# Patient Record
Sex: MALE | Race: WHITE
[De-identification: names, ages, dates, MRNs, and addresses within clinical notes are randomized per-mention and may not be internally consistent; named-entity substitution may affect disease eponyms.]

---

## 2018-04-03 ENCOUNTER — HOSPITAL ENCOUNTER (EMERGENCY)
Dept: HOSPITAL 33 - ED | Age: 58
Discharge: HOME | End: 2018-04-03
Payer: MEDICARE

## 2018-04-03 VITALS — OXYGEN SATURATION: 98 %

## 2018-04-03 VITALS — DIASTOLIC BLOOD PRESSURE: 75 MMHG | HEART RATE: 70 BPM | SYSTOLIC BLOOD PRESSURE: 168 MMHG

## 2018-04-03 DIAGNOSIS — Z79.899: ICD-10-CM

## 2018-04-03 DIAGNOSIS — K59.01: Primary | ICD-10-CM

## 2018-04-03 DIAGNOSIS — E87.1: ICD-10-CM

## 2018-04-03 LAB
ALBUMIN SERPL-MCNC: 4.9 G/DL (ref 3.5–5)
ALP SERPL-CCNC: 76 U/L (ref 38–126)
ALT SERPL-CCNC: 21 U/L (ref 0–50)
ANION GAP SERPL CALC-SCNC: 17.4 MEQ/L (ref 5–15)
AST SERPL QL: 26 U/L (ref 17–59)
BILIRUB BLD-MCNC: 0.6 MG/DL (ref 0.2–1.3)
BUN SERPL-MCNC: 5 MG/DL (ref 9–20)
CALCIUM SPEC-MCNC: 9.7 MG/DL (ref 8.4–10.2)
CELLS COUNTED: 100
CHLORIDE SERPL-SCNC: 88 MMOL/L (ref 98–107)
CO2 SERPL-SCNC: 26 MMOL/L (ref 22–30)
CREAT SERPL-MCNC: 0.6 MG/DL (ref 0.66–1.25)
GLUCOSE SERPL-MCNC: 118 MG/DL (ref 74–106)
GLUCOSE UR-MCNC: NEGATIVE MG/DL
GRANULOCYTES # BLD AUTO: 8.87 10*3/UL (ref 1.4–6.9)
HCT VFR BLD AUTO: 43.5 % (ref 42–50)
HGB BLD-MCNC: 15.5 GM/DL (ref 12.5–18)
MANUAL DIF COMMENT BLD-IMP: NORMAL
MCH RBC QN AUTO: 30.5 PG (ref 26–32)
MCHC RBC AUTO-ENTMCNC: 35.6 G/DL (ref 32–36)
PLATELET # BLD AUTO: 185 K/MM3 (ref 150–450)
POTASSIUM SERPLBLD-SCNC: 5.2 MMOL/L (ref 3.5–5.1)
PROT SERPL-MCNC: 7.8 G/DL (ref 6.3–8.2)
PROT UR STRIP-MCNC: NEGATIVE MG/DL
RBC # BLD AUTO: 5.08 M/MM3 (ref 4.1–5.6)
SODIUM SERPL-SCNC: 126 MMOL/L (ref 137–145)
WBC # BLD AUTO: 10.1 K/MM3 (ref 4–10.5)

## 2018-04-03 PROCEDURE — 84443 ASSAY THYROID STIM HORMONE: CPT

## 2018-04-03 PROCEDURE — 81002 URINALYSIS NONAUTO W/O SCOPE: CPT

## 2018-04-03 PROCEDURE — 85025 COMPLETE CBC W/AUTO DIFF WBC: CPT

## 2018-04-03 PROCEDURE — 36415 COLL VENOUS BLD VENIPUNCTURE: CPT

## 2018-04-03 PROCEDURE — 74022 RADEX COMPL AQT ABD SERIES: CPT

## 2018-04-03 PROCEDURE — 80053 COMPREHEN METABOLIC PANEL: CPT

## 2018-04-03 PROCEDURE — 99283 EMERGENCY DEPT VISIT LOW MDM: CPT

## 2018-04-03 PROCEDURE — 99284 EMERGENCY DEPT VISIT MOD MDM: CPT

## 2018-04-03 NOTE — ERPHSYRPT
- History of Present Illness


Time Seen by Provider: 04/03/18 11:30


Source: patient


Patient Subjective Stated Complaint: Pt states "I have been having troubles 

with constipation.  I have a hard peice stuck up on there and I have tried to 

flush it out and I keep pushing it up farther."


Triage Nursing Assessment: Pt alert and oriented X 3, skin pwd PT ambulates 

without any difficulty, able to speak in clear full sentences. Pt in no 

apparent respiratory distress.


Physician History: 





CC: constipation


HX: 58 y/o patient of Dr Hernandez. He had prior stroke 3 years ago. Went home 

from the hospital on lactulose for constipation. Denies hx of liver disease. He 

had colonoscopy 3 years ago in [place]. He has continued constipation. Feels 

like rectal plug. Used enema bulb twice and thought it pushed up higher. He has 

tried stool softeners. Came to ER as unable to stool. No fever, chills, abd pain

, vomiting, back pain, or diff with urination.


Allergies/Adverse Reactions: 








Sulfa (Sulfonamide Antibiotics) Allergy (Intermediate, Verified 04/03/18 11:38)


 burn when urinating





Home Medications: 








Clopidogrel Bisulfate [Clopidogrel] 75 mg PO DAILY 04/03/18 [History]


Flu Vac Qs 17-18(4Yr Up)Merced/Pf [Flucelvax Quad 7590-4894 Syr] 1 tab PO DAILY 04/ 03/18 [History]


Lactulose [Lactulose] 2 mg PO DAILY 04/03/18 [History]


Lisinopril [Lisinopril] 20 mg PO DAILY 04/03/18 [History]





Hx Tetanus, Diphtheria Vaccination/Date Given: Yes


Hx Influenza Vaccination/Date Given: Yes


Hx Pneumococcal Vaccination/Date Given: No


Immunizations Up to Date: Yes





- Review of Systems


Constitutional: No Fever, No Chills


Eyes: No Symptoms


Ears, Nose, & Throat: No Symptoms


Respiratory: No Cough


Cardiac: No Chest Pain


Abdominal/Gastrointestinal: Constipation, No Abdominal Pain, No Nausea, No 

Vomiting, No Diarrhea


Genitourinary Symptoms: No Dysuria


Musculoskeletal: No Back Pain


Skin: No Rash


Neurological: No Headache


All Other Systems: Reviewed and Negative





- Past Medical History


Pertinent Past Medical History: Yes


Neurological History: Stroke


ENT History: No Pertinent History


Cardiac History: No Pertinent History


Respiratory History: No Pertinent History


Endocrine Medical History: No Pertinent History


Musculoskeletal History: Arthritis


GI Medical History: Other


 History: No Pertinent History


Psycho-Social History: Anxiety


Male Reproductive Disorders: No Pertinent History


Other Medical History: constipation





- Past Surgical History


Past Surgical History: No





- Social History


Smoking Status: Former smoker


Exposure to second hand smoke: No


Drug Use: none


Patient Lives Alone: Yes





- Nursing Vital Signs


Nursing Vital Signs: 


 Initial Vital Signs











Temperature  98.2 F   04/03/18 11:24


 


Pulse Rate  108 H  04/03/18 11:24


 


Respiratory Rate  16   04/03/18 11:24


 


Blood Pressure  162/92   04/03/18 11:24


 


O2 Sat by Pulse Oximetry  98   04/03/18 11:24








 Pain Scale











Pain Intensity                 0

















- Physical Exam


General Appearance: alert


Eye Exam: PERRL/EOMI


Ears, Nose, Throat Exam: normal ENT inspection, moist mucous membranes


Neck Exam: normal inspection, non-tender, supple


Respiratory Exam: normal breath sounds


Cardiovascular Exam: regular rate/rhythm


Gastrointestinal/Abdomen Exam: soft, No tenderness, No distention, No guarding


Male Genitalia Exam: normal genitalia


Rectal Exam: normal rectal tone, other (minimal soft brown stool high no fecal 

impaction or plug. With RN Timo), No mass, No blood


Back Exam: normal inspection, No vertebral tenderness


Extremity Exam: normal inspection, normal range of motion


Neurologic Exam: alert, oriented x 3, cooperative, CNs II-XII nml as tested, 

sensation nml, No motor deficits


Skin Exam: warm, dry, No rash


**SpO2 Interpretation**: normal


SpO2: 98


Oxygen Delivery: Room Air





- Course


Nursing assessment & vital signs reviewed: Yes





- Radiology Exams


  ** AAS


X-ray Interpretation: Teleradiologist Report (negative, fecal stasis)


Ordered Tests: 


 Active Orders 24 hr











 Category Date Time Status


 


 Clean Catch Urine Specimen STAT Care  04/03/18 11:47 Active


 


 OBSTR/ACUTE ABDOMEN SERIES Stat Exams  04/03/18 11:47 Taken


 


 CBC W DIFF Stat Lab  04/03/18 11:47 Completed


 


 CMP Stat Lab  04/03/18 11:47 Completed


 


 Manual Differential NC Stat Lab  04/03/18 11:47 Completed


 


 TSH [TSH, 3RD Generation] Stat Lab  04/03/18 11:48 Ordered


 


 UA W/RFX UR CULTURE Stat Lab  04/03/18 12:12 Completed








Medication Summary














Discontinued Medications














Generic Name Dose Route Start Last Admin





  Trade Name Freq  PRN Reason Stop Dose Admin


 


Lidocaine HCl  200 mg  04/03/18 12:11  04/03/18 12:21





  Xylocaine 2% Uro-Jet***  TOP  04/03/18 12:12  200 mg





  STAT ONE   Administration


 


Lidocaine HCl  Confirm  04/03/18 12:12  





  Xylocaine 2% Uro-Jet***  Administered  04/03/18 12:13  





  Dose   





  200 mg   





  .ROUTE   





  .STK-MED ONE   











Lab/Rad Data: 


 Laboratory Result Diagrams





 04/03/18 11:47 





 04/03/18 11:47 





 Laboratory Results











  04/03/18 04/03/18 04/03/18 Range/Units





  12:12 11:47 11:47 


 


WBC    10.1  (4.0-10.5)  K/mm3


 


RBC    5.08  (4.1-5.6)  M/mm3


 


Hgb    15.5  (12.5-18.0)  gm/dl


 


Hct    43.5  (42-50)  %


 


MCV    85.6  ()  fl


 


MCH    30.5  (26-32)  pg


 


MCHC    35.6  (32-36)  g/dl


 


RDW    11.8  (11.5-14.0)  %


 


Plt Count    185  (150-450)  K/mm3


 


MPV    10.5 H  (6-9.5)  fl


 


Absolute Granulocytes    8.87 H  (1.4-6.9)  


 


Sodium   126 L   (137-145)  mmol/L


 


Potassium   5.2 H   (3.5-5.1)  mmol/L


 


Chloride   88 L   ()  mmol/L


 


Carbon Dioxide   26   (22-30)  mmol/L


 


Anion Gap   17.4 H   (5-15)  MEQ/L


 


BUN   5 L   (9-20)  mg/dL


 


Creatinine   0.60 L   (0.66-1.25)  mg/dL


 


Estimated GFR   > 60.0   ML/MIN


 


Glucose   118 H   ()  mg/dL


 


Calcium   9.7   (8.4-10.2)  mg/dL


 


Total Bilirubin   0.60   (0.2-1.3)  mg/dL


 


AST   26   (17-59)  U/L


 


ALT   21   (0-50)  U/L


 


Alkaline Phosphatase   76   ()  U/L


 


Serum Total Protein   7.8   (6.3-8.2)  g/dL


 


Albumin   4.9   (3.5-5.0)  g/dL


 


Ur Collection Type  CLEAN CATCH    


 


Urine Color  YELLOW    (YELLOW)  


 


Urine Appearance  CLEAR    (CLEAR)  


 


Urine pH  7.0    (5-6)  


 


Ur Specific Gravity  1.010    (1.005-1.025)  


 


Urine Protein  NEGATIVE    (Negative)  


 


Urine Ketones  TRACE    (NEGATIVE)  


 


Urine Blood  NEGATIVE    (0-5)  Kg/ul


 


Urine Nitrite  NEGATIVE    (NEGATIVE)  


 


Urine Bilirubin  NEGATIVE    (NEGATIVE)  


 


Urine Urobilinogen  NORMAL    (0-1)  mg/dL


 


Ur Leukocyte Esterase  NEGATIVE    (NEGATIVE)  


 


Urine Culture Reflexed  NO    (NO)  


 


Urine Glucose  NEGATIVE    (NEGATIVE)  mg/dL


 


Specimen Received  04-03-18 1200    














- Progress


Progress Note: 





04/03/18 12:19


Advised mag citrate and miralax and to follow up with Dr Hernandez for bowel 

regimen. 


04/03/18 13:08


Low Na. Usually low 130. Called Dr Hernandez who will recheck lytes and see in 

office tomorrow 8:45.





Counseled pt/family regarding: drug and/or alcohol abuse, lab results, diagnosis

, need for follow-up





- Departure


Time of Disposition: 13:08


Departure Disposition: Home


Clinical Impression: 


 Hyponatremia





Constipation


Qualifiers:


 Constipation type: slow transit constipation Qualified Code(s): K59.01 - Slow 

transit constipation





Condition: Stable


Critical Care Time: No


Referrals: 


OMAR HERNANDEZ [Primary Care Provider] - 


Instructions:  Constipation, Adult (DC), Hyponatremia (DC)


Additional Instructions: 


Take one bottle mag citrate now.


Take miralax 17gm with cup of prune juice nightly until going good.


Follow up tomorrow at 8:45AM with Dr Hernandez.


Get blood drawn at hospital lab before Dr appointment in AM.











Prescriptions: 


Polyethylene Glycol 3350 [Miralax Powder] 17 g PO QHS #1 bottle


Magnesium Citrate 296 ml*** [Citroma 296 ml***] 300 ml PO STAT #1 solution

## 2020-08-24 ENCOUNTER — HOSPITAL ENCOUNTER (INPATIENT)
Dept: HOSPITAL 33 - ED | Age: 60
LOS: 2 days | Discharge: HOME | DRG: 641 | End: 2020-08-26
Attending: FAMILY MEDICINE | Admitting: FAMILY MEDICINE
Payer: MEDICARE

## 2020-08-24 DIAGNOSIS — R11.2: ICD-10-CM

## 2020-08-24 DIAGNOSIS — E78.5: ICD-10-CM

## 2020-08-24 DIAGNOSIS — Z79.01: ICD-10-CM

## 2020-08-24 DIAGNOSIS — I69.851: ICD-10-CM

## 2020-08-24 DIAGNOSIS — I10: ICD-10-CM

## 2020-08-24 DIAGNOSIS — R55: ICD-10-CM

## 2020-08-24 DIAGNOSIS — Z86.79: ICD-10-CM

## 2020-08-24 DIAGNOSIS — Z79.899: ICD-10-CM

## 2020-08-24 DIAGNOSIS — E87.1: Primary | ICD-10-CM

## 2020-08-24 LAB
ALBUMIN SERPL-MCNC: 5.2 G/DL (ref 3.5–5)
ALP SERPL-CCNC: 57 U/L (ref 38–126)
ALT SERPL-CCNC: 27 U/L (ref 0–50)
AMPHETAMINES UR QL: NEGATIVE
ANION GAP SERPL CALC-SCNC: 17.1 MEQ/L (ref 5–15)
AST SERPL QL: 37 U/L (ref 17–59)
BARBITURATES UR QL: NEGATIVE
BASOPHILS # BLD AUTO: 0.02 10*3/UL (ref 0–0.4)
BASOPHILS NFR BLD AUTO: 0.2 % (ref 0–0.4)
BENZODIAZ UR QL SCN: NEGATIVE
BILIRUB BLD-MCNC: 1.3 MG/DL (ref 0.2–1.3)
BLD SMEAR INTERP: YES
BUN SERPL-MCNC: 5 MG/DL (ref 9–20)
CALCIUM SPEC-MCNC: 9.6 MG/DL (ref 8.4–10.2)
CHLORIDE SERPL-SCNC: 82 MMOL/L (ref 98–107)
CO2 SERPL-SCNC: 27 MMOL/L (ref 22–30)
COCAINE UR QL SCN: NEGATIVE
CREAT SERPL-MCNC: 0.72 MG/DL (ref 0.66–1.25)
D DIMER BLD IA.RAPID-MCNC: 444 NG/ML (ref 215–500)
EOSINOPHIL # BLD AUTO: 0.01 10*3/UL (ref 0–0.5)
ETHANOL SERPL-MCNC: < 10 MG/DL (ref 0–10)
GFR SERPLBLD BASED ON 1.73 SQ M-ARVRAT: > 60 ML/MIN
GLUCOSE SERPL-MCNC: 113 MG/DL (ref 74–106)
GLUCOSE UR-MCNC: NEGATIVE MG/DL
HCT VFR BLD AUTO: 42.6 % (ref 42–50)
HGB BLD-MCNC: 15 GM/DL (ref 12.5–18)
INR PPP: 1.18 (ref 0.8–3)
LYMPHOCYTES # SPEC AUTO: 0.59 10*3/UL (ref 1–4.6)
MCH RBC QN AUTO: 30.4 PG (ref 26–32)
MCHC RBC AUTO-ENTMCNC: 35.2 G/DL (ref 32–36)
METHADONE UR QL: NEGATIVE
MONOCYTES # BLD AUTO: 0.6 10*3/UL (ref 0–1.3)
OPIATES UR QL: NEGATIVE
PCP UR QL CFM>20 NG/ML: NEGATIVE
PLATELET # BLD AUTO: 171 K/MM3 (ref 150–450)
POTASSIUM SERPLBLD-SCNC: 4.2 MMOL/L (ref 3.5–5.1)
PROT SERPL-MCNC: 8 G/DL (ref 6.3–8.2)
PROT UR STRIP-MCNC: NEGATIVE MG/DL
PROTHROMBIN TIME: 13.3 SECONDS (ref 8.83–12.87)
RBC # BLD AUTO: 4.93 M/MM3 (ref 4.1–5.6)
RBC #/AREA URNS HPF: (no result) /HPF (ref 0–2)
SODIUM SERPL-SCNC: 121 MMOL/L (ref 137–145)
THC UR QL SCN: NEGATIVE
WBC # BLD AUTO: 11.7 K/MM3 (ref 4–10.5)
WBC #/AREA URNS HPF: (no result) /HPF (ref 0–5)

## 2020-08-24 PROCEDURE — 93041 RHYTHM ECG TRACING: CPT

## 2020-08-24 PROCEDURE — 70450 CT HEAD/BRAIN W/O DYE: CPT

## 2020-08-24 PROCEDURE — 96360 HYDRATION IV INFUSION INIT: CPT

## 2020-08-24 PROCEDURE — 87040 BLOOD CULTURE FOR BACTERIA: CPT

## 2020-08-24 PROCEDURE — 83605 ASSAY OF LACTIC ACID: CPT

## 2020-08-24 PROCEDURE — 93005 ELECTROCARDIOGRAM TRACING: CPT

## 2020-08-24 PROCEDURE — 85025 COMPLETE CBC W/AUTO DIFF WBC: CPT

## 2020-08-24 PROCEDURE — 80307 DRUG TEST PRSMV CHEM ANLYZR: CPT

## 2020-08-24 PROCEDURE — 95812 EEG 41-60 MINUTES: CPT

## 2020-08-24 PROCEDURE — 85379 FIBRIN DEGRADATION QUANT: CPT

## 2020-08-24 PROCEDURE — 36415 COLL VENOUS BLD VENIPUNCTURE: CPT

## 2020-08-24 PROCEDURE — 84484 ASSAY OF TROPONIN QUANT: CPT

## 2020-08-24 PROCEDURE — 99284 EMERGENCY DEPT VISIT MOD MDM: CPT

## 2020-08-24 PROCEDURE — 71045 X-RAY EXAM CHEST 1 VIEW: CPT

## 2020-08-24 PROCEDURE — 81001 URINALYSIS AUTO W/SCOPE: CPT

## 2020-08-24 PROCEDURE — 80053 COMPREHEN METABOLIC PANEL: CPT

## 2020-08-24 PROCEDURE — G0480 DRUG TEST DEF 1-7 CLASSES: HCPCS

## 2020-08-24 PROCEDURE — 85610 PROTHROMBIN TIME: CPT

## 2020-08-24 NOTE — ERPHSYRPT
- History of Present Illness


Source: patient


Exam Limitations: clinical condition


Patient Subjective Stated Complaint: PT HERE FOR FOR A SYNCOPE EPISODE TODAY 

AFTER GOING TO CARDIOLOGY OFFICE TODAY AFTER FAILING A STRESS TEST A MONTH AGO, 

HE ASLO STATES HE WAS IN ER LAST NIGHT FOR PAIN TO LEFT ARM AND SENT HOME, HE 

STATES HE HAS NOT FELT WELL FOR A FEW DAYS, IT STATES HE FEELS CONSTIPATED


Triage Nursing Assessment: PT ALERT , RESP EASY,. FACE MASK IN PLACE, PALE, 

DENIES PAIN NOW,NO EDEMA NOTED


Physician History: 





Is a 60-year-old male who presents after having a syncopal episode versus 

seizure at home today.  He recently had a stress test with Dr. May and 

according to him he "failed the test.  Today he saw Dr. Sanford who told him t

hat he was doing okay and he wanted to get a carotid Doppler on him scheduled 

for September 2.  After he left he went to his home where he was witnessed to 

have a syncopal episode versus seizure he is had some nausea and vomiting 

associated with this episode and presently is alert and oriented but not feeling

well..  It turns out he was actually seen at [facility] in 

[place] yesterday at that time he was evaluated and found to have a sodium of 

125 which was not mentioned in the commentary.  He did recently have a change in

his blood pressure medicines from lisinopril to lisinopril hydrochlorothiazide 

combination.


Timing/Duration: today (Syncope versus seizure)


Severity: moderate


Character of Deficits: none


Deficits: no difficulties


Baseline/Normal Cognition: alert oriented x 3


Current Cognition: alert oriented x 3


Baseline Gait: walks w/o assistance


Associated Symptoms: nausea, vomiting, headache


Allergies/Adverse Reactions: 








Sulfa (Sulfonamide Antibiotics) Allergy (Intermediate, Verified 08/24/20 15:03)


   burn when urinating





Home Medications: 








Clopidogrel Bisulfate [Clopidogrel] 75 mg PO DAILY 04/03/18 [History]


Flu Vac Qs 17-18(4Yr Up)Merced/Pf [Flucelvax Quad 8202-1645 Syr] 1 tab PO DAILY 

04/03/18 [History]


Lactulose 2 mg PO DAILY 04/03/18 [History]


lisinopriL [Lisinopril] 20 mg PO DAILY 04/03/18 [History]





Hx Tetanus, Diphtheria Vaccination/Date Given: Yes


Hx Influenza Vaccination/Date Given: Yes


Hx Pneumococcal Vaccination/Date Given: No


Immunizations Up to Date: Yes





Travel Risk





- International Travel


Have you traveled outside of the country in past 3 weeks: No





- Coronavirus Screening


Are you exhibiting any of the following symptoms?: No


Close contact with a COVID-19 positive Pt in past 14-21 Days: No





- Review of Systems


Constitutional: No Fever, No Chills


Eyes: No Symptoms


Ears, Nose, & Throat: No Symptoms


Respiratory: No Cough, No Dyspnea


Cardiac: No Chest Pain, No Edema, No Syncope


Abdominal/Gastrointestinal: No Abdominal Pain, No Nausea, No Vomiting, No 

Diarrhea


Genitourinary Symptoms: No Dysuria


Musculoskeletal: No Back Pain, No Neck Pain


Skin: No Rash


Neurological: Seizure (Syncope), No Dizziness, No Focal Weakness, No Sensory 

Changes


Psychological: No Symptoms


Endocrine: No Symptoms


All Other Systems: Reviewed and Negative





- Past Medical History


Pertinent Past Medical History: Yes


Neurological History: Stroke


ENT History: No Pertinent History


Cardiac History: No Pertinent History


Respiratory History: No Pertinent History


Endocrine Medical History: No Pertinent History


Musculoskeletal History: Arthritis


GI Medical History: Other


 History: No Pertinent History


Psycho-Social History: Anxiety


Male Reproductive Disorders: No Pertinent History


Other Medical History: constipation, FAILED STRESS TEST 7/2020





- Past Surgical History


Past Surgical History: No





- Social History


Smoking Status: Former smoker


Exposure to second hand smoke: No


Drug Use: none


Patient Lives Alone: Yes





- Nursing Vital Signs


Nursing Vital Signs: 





                               Initial Vital Signs











Pulse Rate  67   08/24/20 14:50


 


Respiratory Rate  18   08/24/20 14:50


 


Blood Pressure  130/77   08/24/20 14:50


 


O2 Sat by Pulse Oximetry  99   08/24/20 14:50








                                   Pain Scale











Pain Intensity                 0

















- [place] Coma Scale


Best Eye Response ([place]): (4) open spontaneously


Best Verbal Response ([place]): (5) oriented


Best Motor Response ([place]): (6) obeys commands


[place] Total: 15





- Physical Exam


General Appearance: no apparent distress, alert


Eye Exam: bilateral eye: PERRL, EOMI


Ears, Nose, Throat Exam: normal ENT inspection, moist mucous membranes


Neck Exam: normal inspection, non-tender, supple


Respiratory: normal breath sounds, lungs clear, airway intact, No respiratory 

distress


Cardiovascular: regular rate/rhythm, No edema


Gastrointestinal: soft, No tenderness, No distention


Back Exam: normal inspection


Extremity Exam: normal inspection, No pedal edema


Mental Status: alert, oriented x 3


CNs Exam: tongue midline


Coordination/Gait: normal finger to nose, normal gait


Skin Exam: normal color, warm, dry, No rash


SpO2: 100





- Course


Nursing assessment & vital signs reviewed: Yes


EKG Interpreted by Me: RATE (61), NORMAL AXIS, NORMAL INTERVALS, NORMAL QRS, 

NORMAL ST-T





- Radiology Exams


  ** X-ray no acute findings


X-ray Interpretation: Other (No acute findings)





- CT Exams


  ** Head


CT Interpretation: No/Intracranial Hemorrhag ( CT of the head), Other (No acute 

findings on)


Ordered Tests: 





                               Active Orders 24 hr











 Category Date Time Status


 


 Cardiac Monitor STAT Care  08/24/20 15:13 Active


 


 EKG-ER Only STAT Care  08/24/20 15:10 Active


 


 CHEST 1 VIEW (PORTABLE) Stat Exams  08/24/20 15:10 Completed


 


 HEAD WITHOUT CONTRAST [CT] Stat Exams  08/24/20 15:13 Completed


 


 BLOOD CULTURE Stat Lab  08/24/20 15:34 Ordered


 


 CBC W DIFF Stat Lab  08/24/20 15:34 Completed


 


 CMP Stat Lab  08/24/20 15:34 Completed


 


 D-DIMER QUANTITATIVE Stat Lab  08/24/20 15:34 Received


 


 ETHYL ALCOHOL Stat Lab  08/24/20 15:34 Completed


 


 PROTIME WITH INR Stat Lab  08/24/20 15:34 Received


 


 TROPONIN Q3H Lab  08/24/20 15:15 Completed


 


 TROPONIN Q3H Lab  08/24/20 18:15 Ordered


 


 TROPONIN Q3H Lab  08/24/20 21:15 Ordered


 


 UA W/RFX UR CULTURE Stat Lab  08/24/20 16:27 Ordered


 


 Urine Triage Profile Stat Lab  08/24/20 16:27 Completed








Medication Summary














Discontinued Medications














Generic Name Dose Route Start Last Admin





  Trade Name Christina  PRN Reason Stop Dose Admin


 


Sodium Chloride  1,000 mls @ 999 mls/hr  08/24/20 15:10  08/24/20 17:15





  Sodium Chloride 0.9% 1000 Ml  IV  08/24/20 16:10  Infused





  .Q1H1M STA   Infusion


 


Sodium Chloride  Confirm  08/24/20 15:19 





  Sodium Chloride 0.9% 1000 Ml  Administered  08/24/20 15:20 





  Dose  





  1,000 mls @ ud  





  .ROUTE  





  .STK-MED ONE  











Lab/Rad Data: 





                           Laboratory Result Diagrams





                                 08/24/20 15:34 





                                 08/24/20 15:34 





                               Laboratory Results











  08/24/20 08/24/20 08/24/20 Range/Units





  16:27 15:34 15:34 


 


WBC    11.7 H  (4.0-10.5)  K/mm3


 


RBC    4.93  (4.1-5.6)  M/mm3


 


Hgb    15.0  (12.5-18.0)  gm/dl


 


Hct    42.6  (42-50)  %


 


MCV    86.4  ()  fl


 


MCH    30.4  (26-32)  pg


 


MCHC    35.2  (32-36)  g/dl


 


RDW    11.9  (11.5-14.0)  %


 


Plt Count    171  (150-450)  K/mm3


 


MPV    11.6 H  (7.5-11.0)  fl


 


Gran %    89.6 H  (36.0-66.0)  %


 


Eos # (Auto)    0.01  (0-0.5)  


 


Absolute Lymphs (auto)    0.59 L  (1.0-4.6)  


 


Absolute Monos (auto)    0.60  (0.0-1.3)  


 


Lymphocytes %    5.0 L  (24.0-44.0)  %


 


Monocytes %    5.1  (0.0-12.0)  %


 


Eosinophils %    0.1  (0.00-5.0)  %


 


Basophils %    0.2  (0.0-0.4)  %


 


Absolute Granulocytes    10.48 H  (1.4-6.9)  


 


Basophils #    0.02  (0-0.4)  


 


Sodium   121 L   (137-145)  mmol/L


 


Potassium   4.2   (3.5-5.1)  mmol/L


 


Chloride   82 L   ()  mmol/L


 


Carbon Dioxide   27   (22-30)  mmol/L


 


Anion Gap   17.1 H   (5-15)  MEQ/L


 


BUN   5 L   (9-20)  mg/dL


 


Creatinine   0.72   (0.66-1.25)  mg/dL


 


Estimated GFR   > 60.0   ML/MIN


 


Glucose   113 H   ()  mg/dL


 


Calcium   9.6   (8.4-10.2)  mg/dL


 


Total Bilirubin   1.30   (0.2-1.3)  mg/dL


 


AST   37   (17-59)  U/L


 


ALT   27   (0-50)  U/L


 


Alkaline Phosphatase   57   ()  U/L


 


Troponin I     (0.000-0.034)  ng/mL


 


Serum Total Protein   8.0   (6.3-8.2)  g/dL


 


Albumin   5.2 H   (3.5-5.0)  g/dL


 


Urine Opiates Level  NEGATIVE    (NEGATIVE)  


 


Ur Methadone  NEGATIVE    (NEGATIVE)  


 


Urine Barbiturates  NEGATIVE    (NEGATIVE)  


 


Ur Phencyclidine (PCP)  NEGATIVE    (NEGATIVE)  


 


Urine Amphetamine  NEGATIVE    (NEGATIVE)  


 


U Benzodiazepine Level  NEGATIVE    (NEGATIVE)  


 


Urine Cocaine  NEGATIVE    (NEGATIVE)  


 


Urine Marijuana (THC)  NEGATIVE    (NEGATIVE)  


 


Ethyl Alcohol   < 10   (0-10)  mg/dL














  08/24/20 Range/Units





  15:15 


 


WBC   (4.0-10.5)  K/mm3


 


RBC   (4.1-5.6)  M/mm3


 


Hgb   (12.5-18.0)  gm/dl


 


Hct   (42-50)  %


 


MCV   ()  fl


 


MCH   (26-32)  pg


 


MCHC   (32-36)  g/dl


 


RDW   (11.5-14.0)  %


 


Plt Count   (150-450)  K/mm3


 


MPV   (7.5-11.0)  fl


 


Gran %   (36.0-66.0)  %


 


Eos # (Auto)   (0-0.5)  


 


Absolute Lymphs (auto)   (1.0-4.6)  


 


Absolute Monos (auto)   (0.0-1.3)  


 


Lymphocytes %   (24.0-44.0)  %


 


Monocytes %   (0.0-12.0)  %


 


Eosinophils %   (0.00-5.0)  %


 


Basophils %   (0.0-0.4)  %


 


Absolute Granulocytes   (1.4-6.9)  


 


Basophils #   (0-0.4)  


 


Sodium   (137-145)  mmol/L


 


Potassium   (3.5-5.1)  mmol/L


 


Chloride   ()  mmol/L


 


Carbon Dioxide   (22-30)  mmol/L


 


Anion Gap   (5-15)  MEQ/L


 


BUN   (9-20)  mg/dL


 


Creatinine   (0.66-1.25)  mg/dL


 


Estimated GFR   ML/MIN


 


Glucose   ()  mg/dL


 


Calcium   (8.4-10.2)  mg/dL


 


Total Bilirubin   (0.2-1.3)  mg/dL


 


AST   (17-59)  U/L


 


ALT   (0-50)  U/L


 


Alkaline Phosphatase   ()  U/L


 


Troponin I  < 0.012  (0.000-0.034)  ng/mL


 


Serum Total Protein   (6.3-8.2)  g/dL


 


Albumin   (3.5-5.0)  g/dL


 


Urine Opiates Level   (NEGATIVE)  


 


Ur Methadone   (NEGATIVE)  


 


Urine Barbiturates   (NEGATIVE)  


 


Ur Phencyclidine (PCP)   (NEGATIVE)  


 


Urine Amphetamine   (NEGATIVE)  


 


U Benzodiazepine Level   (NEGATIVE)  


 


Urine Cocaine   (NEGATIVE)  


 


Urine Marijuana (THC)   (NEGATIVE)  


 


Ethyl Alcohol   (0-10)  mg/dL














- Progress


Progress: improved


Discussed with Dr.: Duarte





- Departure


Departure Disposition: In-patient Admission


Clinical Impression: 


 Hyponatremia





Condition: Fair


Critical Care Time: No


Referrals: 


OMAR PORTILLO [Primary Care Provider] - 


Instructions:  Hyponatremia (DC)

## 2020-08-24 NOTE — XRAY
Indication: Syncope.  Vomiting.



Comparison: April 3, 2018.



Portable chest again demonstrates normal heart and lungs with a few incidental

left lung calcified granulomas.  Bony thorax intact.  No new/acute findings.

## 2020-08-24 NOTE — XRAY
Indication: Syncopal episode.  Vomiting and sweating.



Multiple contiguous axial images obtained through the head without contrast.



Comparison: None



Age appropriate global atrophy.  Small 1 cm focus old infarct adjacent to left

frontal horn.  No acute intra-cranial hemorrhage, abnormal extra-axial fluid

collection, or mass effect.  Fourth ventricle is midline without

hydrocephalus.  Gray-white matter differentiation preserved.  Bony calvarium

intact.  Minimal mucosal thickening floor of both maxillary sinuses.

Remaining visualized paranasal sinuses and mastoid air cells are clear.



Impression: Small focus old infarct adjacent to left frontal horn.  No acute

intracranial abnormalities.  Minimal paranasal sinus disease.

## 2020-08-25 LAB
ALBUMIN SERPL-MCNC: 4.6 G/DL (ref 3.5–5)
ALP SERPL-CCNC: 54 U/L (ref 38–126)
ALT SERPL-CCNC: 25 U/L (ref 0–50)
ANION GAP SERPL CALC-SCNC: 13.6 MEQ/L (ref 5–15)
AST SERPL QL: 33 U/L (ref 17–59)
BASOPHILS # BLD AUTO: 0.01 10*3/UL (ref 0–0.4)
BASOPHILS NFR BLD AUTO: 0.1 % (ref 0–0.4)
BILIRUB BLD-MCNC: 1.4 MG/DL (ref 0.2–1.3)
BUN SERPL-MCNC: 5 MG/DL (ref 9–20)
CALCIUM SPEC-MCNC: 9.2 MG/DL (ref 8.4–10.2)
CHLORIDE SERPL-SCNC: 88 MMOL/L (ref 98–107)
CO2 SERPL-SCNC: 24 MMOL/L (ref 22–30)
CREAT SERPL-MCNC: 0.63 MG/DL (ref 0.66–1.25)
EOSINOPHIL # BLD AUTO: 0.04 10*3/UL (ref 0–0.5)
GFR SERPLBLD BASED ON 1.73 SQ M-ARVRAT: > 60 ML/MIN
GLUCOSE SERPL-MCNC: 100 MG/DL (ref 74–106)
HCT VFR BLD AUTO: 41.1 % (ref 42–50)
HGB BLD-MCNC: 14.2 GM/DL (ref 12.5–18)
LYMPHOCYTES # SPEC AUTO: 1.14 10*3/UL (ref 1–4.6)
MCH RBC QN AUTO: 30 PG (ref 26–32)
MCHC RBC AUTO-ENTMCNC: 34.5 G/DL (ref 32–36)
MONOCYTES # BLD AUTO: 0.72 10*3/UL (ref 0–1.3)
PLATELET # BLD AUTO: 145 K/MM3 (ref 150–450)
POTASSIUM SERPLBLD-SCNC: 4.1 MMOL/L (ref 3.5–5.1)
PROT SERPL-MCNC: 7.2 G/DL (ref 6.3–8.2)
RBC # BLD AUTO: 4.74 M/MM3 (ref 4.1–5.6)
SODIUM SERPL-SCNC: 121 MMOL/L (ref 137–145)
WBC # BLD AUTO: 8.7 K/MM3 (ref 4–10.5)

## 2020-08-25 NOTE — HP
CHIEF COMPLAINT:  Syncopal versus seizure episode. 



HISTORY OF PRESENT ILLNESS:  The patient is a 60 year old white male patient who has been 
under a lot of stress recently. He was having some chest discomfort and was seen at [facility] and ruled out for myocardial infarction and allowed to discharge 
home. He had recently seen Dr. Pagan for cardiology follow up and has additional studies 
scheduled. The patient reports that he had been somewhat nauseated, even the smell of food 
was making him sick at his stomach so he had not eaten anything. He apparently had an 
episode on the way home in the car where he had what appeared to be a syncopal episode 
versus seizure activity. The patient reports that he felt it coming on where everything 
started getting gray and seeing a light and then he woke up and had emesis episode. He was 
brought to the emergency room. Tele-neurology consult was obtained and recommended the 
patient stay in the hospital. During the evaluation he was found to have a sodium of 121. 
The patient had no other significant findings during his evaluation. 



PAST MEDICAL/SURGICAL HISTORY: The patient does have a previous history of CVA involving 
right hemiparesis which is nearly completely resolved. 



MEDICATIONS: He takes medications of Plavix 75 mg daily, lactulose daily for constipation, 
Lisinopril/hydrochlorothiazide which was recently changed to include hydrochlorothiazide 
component. 



ALLERGIES:  SULFA.  

 

PHYSICAL EXAMINATION:  Vital signs in the emergency room showed a pulse of 67, respiratory 
rate 18, blood pressure 130/77.  O2 saturation 99%. 

HEENT:  Normocephalic, atraumatic. Pupils equal round reactive to light. Extraocular 
movements intact. Oropharynx is pink and moist.

NECK:  Supple without lymphadenopathy, thyromegaly or JVD. 

CHEST: Clear to auscultation with good air movement bilaterally. 

HEART: Regular rate and rhythm. 

ABDOMEN: Soft. No palpable masses.

EXTREMITIES: Without cyanosis, clubbing or edema. 

NEUROLOGIC: The patient is alert and oriented x3. No obvious focal deficits are seen at 
this time.

 

LAB DATA AND TESTS:  The patient had chest x-ray which was essentially nonacute. He had 
sinus rhythm on his EKG which appeared to be slight bradycardia. His EKG 12 lead was 
essentially normal with no ST-T wave changes of significance. The patient's lab studies 
revealed his troponins to be less than 0.012. CT scan of the head showed a small focus old 
infarct adjacent to the left frontal horn otherwise no intracranial abnormalities. He had 
a white count of 11,700, hemoglobin 15.0, PLT count 171,000. He had sugar 113, BUN 5, 
creatinine 0.72, sodium 121, potassium 4.2 and chloride 82. Liver enzymes were normal. 
ETOH was less than 10. D-dimer was normal at 444. UA showed specific gravity of 1.011 and 
was otherwise normal.  



ASSESSMENT:  A patient with hyponatremia and what appeared to be a syncopal episode. The 
patient has been admitted to the hospital for observation. He has already had the 
tele-neurology consult who recommended getting his sodium up. We will check an EEG to rule 
out underlying seizure disorder. The patient was placed on fluid restriction with 1,000 cc 
oral. He will receive 50 cc/hour of normal saline IV and salt tablets from the pharmacy. 
We will check his labs in the morning. Once his sodium is improving, he will be discharged 
home should he have no further symptoms particularly in regards to syncope.

## 2020-08-26 VITALS — DIASTOLIC BLOOD PRESSURE: 72 MMHG | SYSTOLIC BLOOD PRESSURE: 133 MMHG | HEART RATE: 64 BPM

## 2020-08-26 VITALS — OXYGEN SATURATION: 98 %

## 2020-08-26 LAB
ALBUMIN SERPL-MCNC: 4.2 G/DL (ref 3.5–5)
ALP SERPL-CCNC: 49 U/L (ref 38–126)
ALT SERPL-CCNC: 28 U/L (ref 0–50)
ANION GAP SERPL CALC-SCNC: 12.1 MEQ/L (ref 5–15)
AST SERPL QL: 34 U/L (ref 17–59)
BASOPHILS # BLD AUTO: 0.02 10*3/UL (ref 0–0.4)
BASOPHILS NFR BLD AUTO: 0.3 % (ref 0–0.4)
BILIRUB BLD-MCNC: 1 MG/DL (ref 0.2–1.3)
BUN SERPL-MCNC: 8 MG/DL (ref 9–20)
CALCIUM SPEC-MCNC: 8.6 MG/DL (ref 8.4–10.2)
CHLORIDE SERPL-SCNC: 96 MMOL/L (ref 98–107)
CO2 SERPL-SCNC: 24 MMOL/L (ref 22–30)
CREAT SERPL-MCNC: 0.65 MG/DL (ref 0.66–1.25)
EOSINOPHIL # BLD AUTO: 0.03 10*3/UL (ref 0–0.5)
GFR SERPLBLD BASED ON 1.73 SQ M-ARVRAT: > 60 ML/MIN
GLUCOSE SERPL-MCNC: 91 MG/DL (ref 74–106)
HCT VFR BLD AUTO: 39.8 % (ref 42–50)
HGB BLD-MCNC: 13.9 GM/DL (ref 12.5–18)
LYMPHOCYTES # SPEC AUTO: 1.03 10*3/UL (ref 1–4.6)
MCH RBC QN AUTO: 30.8 PG (ref 26–32)
MCHC RBC AUTO-ENTMCNC: 34.9 G/DL (ref 32–36)
MONOCYTES # BLD AUTO: 0.59 10*3/UL (ref 0–1.3)
PLATELET # BLD AUTO: 133 K/MM3 (ref 150–450)
POTASSIUM SERPLBLD-SCNC: 4.1 MMOL/L (ref 3.5–5.1)
PROT SERPL-MCNC: 6.7 G/DL (ref 6.3–8.2)
RBC # BLD AUTO: 4.52 M/MM3 (ref 4.1–5.6)
SODIUM SERPL-SCNC: 128 MMOL/L (ref 137–145)
WBC # BLD AUTO: 6.7 K/MM3 (ref 4–10.5)

## 2020-08-27 ENCOUNTER — HOSPITAL ENCOUNTER (OUTPATIENT)
Dept: HOSPITAL 33 - ED | Age: 60
Setting detail: OBSERVATION
LOS: 2 days | Discharge: HOME | End: 2020-08-29
Attending: FAMILY MEDICINE | Admitting: FAMILY MEDICINE
Payer: MEDICARE

## 2020-08-27 DIAGNOSIS — R53.1: Primary | ICD-10-CM

## 2020-08-27 DIAGNOSIS — K59.00: ICD-10-CM

## 2020-08-27 DIAGNOSIS — E87.1: ICD-10-CM

## 2020-08-27 DIAGNOSIS — Z79.899: ICD-10-CM

## 2020-08-27 LAB
ALBUMIN SERPL-MCNC: 4.6 G/DL (ref 3.5–5)
ALP SERPL-CCNC: 58 U/L (ref 38–126)
ALT SERPL-CCNC: 30 U/L (ref 0–50)
ANION GAP SERPL CALC-SCNC: 13.2 MEQ/L (ref 5–15)
AST SERPL QL: 39 U/L (ref 17–59)
BASOPHILS # BLD AUTO: 0.03 10*3/UL (ref 0–0.4)
BASOPHILS NFR BLD AUTO: 0.4 % (ref 0–0.4)
BILIRUB BLD-MCNC: 0.8 MG/DL (ref 0.2–1.3)
BUN SERPL-MCNC: 5 MG/DL (ref 9–20)
CALCIUM SPEC-MCNC: 8.9 MG/DL (ref 8.4–10.2)
CHLORIDE SERPL-SCNC: 91 MMOL/L (ref 98–107)
CO2 SERPL-SCNC: 25 MMOL/L (ref 22–30)
CREAT SERPL-MCNC: 0.61 MG/DL (ref 0.66–1.25)
EOSINOPHIL # BLD AUTO: 0.01 10*3/UL (ref 0–0.5)
GFR SERPLBLD BASED ON 1.73 SQ M-ARVRAT: > 60 ML/MIN
GLUCOSE SERPL-MCNC: 109 MG/DL (ref 74–106)
GLUCOSE UR-MCNC: NEGATIVE MG/DL
HCT VFR BLD AUTO: 38 % (ref 42–50)
HGB BLD-MCNC: 13.1 GM/DL (ref 12.5–18)
LYMPHOCYTES # SPEC AUTO: 0.66 10*3/UL (ref 1–4.6)
MAGNESIUM SERPL-MCNC: 2.1 MG/DL (ref 1.6–2.3)
MCH RBC QN AUTO: 30.3 PG (ref 26–32)
MCHC RBC AUTO-ENTMCNC: 34.5 G/DL (ref 32–36)
MONOCYTES # BLD AUTO: 0.52 10*3/UL (ref 0–1.3)
PLATELET # BLD AUTO: 154 K/MM3 (ref 150–450)
POTASSIUM SERPLBLD-SCNC: 4.1 MMOL/L (ref 3.5–5.1)
PROT SERPL-MCNC: 7.2 G/DL (ref 6.3–8.2)
PROT UR STRIP-MCNC: NEGATIVE MG/DL
RBC # BLD AUTO: 4.33 M/MM3 (ref 4.1–5.6)
RBC #/AREA URNS HPF: (no result) /HPF (ref 0–2)
SODIUM SERPL-SCNC: 126 MMOL/L (ref 137–145)
WBC # BLD AUTO: 7.7 K/MM3 (ref 4–10.5)
WBC #/AREA URNS HPF: (no result) /HPF (ref 0–5)

## 2020-08-27 PROCEDURE — 36415 COLL VENOUS BLD VENIPUNCTURE: CPT

## 2020-08-27 PROCEDURE — 80053 COMPREHEN METABOLIC PANEL: CPT

## 2020-08-27 PROCEDURE — 94760 N-INVAS EAR/PLS OXIMETRY 1: CPT

## 2020-08-27 PROCEDURE — 84484 ASSAY OF TROPONIN QUANT: CPT

## 2020-08-27 PROCEDURE — 83935 ASSAY OF URINE OSMOLALITY: CPT

## 2020-08-27 PROCEDURE — 84300 ASSAY OF URINE SODIUM: CPT

## 2020-08-27 PROCEDURE — 93041 RHYTHM ECG TRACING: CPT

## 2020-08-27 PROCEDURE — 84425 ASSAY OF VITAMIN B-1: CPT

## 2020-08-27 PROCEDURE — 82607 VITAMIN B-12: CPT

## 2020-08-27 PROCEDURE — 99284 EMERGENCY DEPT VISIT MOD MDM: CPT

## 2020-08-27 PROCEDURE — 83930 ASSAY OF BLOOD OSMOLALITY: CPT

## 2020-08-27 PROCEDURE — 93005 ELECTROCARDIOGRAM TRACING: CPT

## 2020-08-27 PROCEDURE — 82746 ASSAY OF FOLIC ACID SERUM: CPT

## 2020-08-27 PROCEDURE — 80048 BASIC METABOLIC PNL TOTAL CA: CPT

## 2020-08-27 PROCEDURE — 94762 N-INVAS EAR/PLS OXIMTRY CONT: CPT

## 2020-08-27 PROCEDURE — 36000 PLACE NEEDLE IN VEIN: CPT

## 2020-08-27 PROCEDURE — 85025 COMPLETE CBC W/AUTO DIFF WBC: CPT

## 2020-08-27 PROCEDURE — G0378 HOSPITAL OBSERVATION PER HR: HCPCS

## 2020-08-27 PROCEDURE — 76770 US EXAM ABDO BACK WALL COMP: CPT

## 2020-08-27 PROCEDURE — 81001 URINALYSIS AUTO W/SCOPE: CPT

## 2020-08-27 PROCEDURE — 96360 HYDRATION IV INFUSION INIT: CPT

## 2020-08-27 PROCEDURE — 83735 ASSAY OF MAGNESIUM: CPT

## 2020-08-27 RX ADMIN — CLOPIDOGREL BISULFATE SCH MG: 75 TABLET ORAL at 14:45

## 2020-08-27 RX ADMIN — LACTULOSE SCH GM: 10 SOLUTION ORAL at 14:45

## 2020-08-27 NOTE — ERPHSYRPT
- History of Present Illness


Time Seen by Provider: 08/27/20 10:00


Source: patient


Exam Limitations: no limitations


Patient Subjective Stated Complaint: weakness and bilat leg aches


Triage Nursing Assessment: pt to ED c/o weakness and bilateral leg ahces x 1 

day. pt was DC from med surg yesterday and states he is feeling bad since 

getting home, ws dx with hyponatremia at that time, was told to get salt tabs 

from pharm upon DC. got tabs and has taken them but is not feeling better. rates

3/10 ache pain in legs. ambulated to ED bed and restroom from waiting room but 

did not have steady gate.


Physician History: 





Patient is a 60-year-old male who presents to our ED with complaints of 

generalized weakness and lower extremity myalgias.  Symptoms started yesterday. 

Patient was discharged from our hospital yesterday as well.  Was admitted for 

treatment of hyponatremia.  While patient was receiving IV fluids he felt well. 

However when the fluids were discontinued patient states his weakness started.  

Weakness has been progressive since he was discharged.  Patient has been taking 

all medications as prescribed.  Patient's myalgias are rated 3 out of 10.  No 

radiation.  No specific worsening or improving factors.  Patient is ambulatory 

however his gait is somewhat unsteady.  Patient voices no other complaints at 

this time.  No chest pain or shortness of breath.  No nausea vomiting or 

diaphoresis.  No trauma.


Timing/Duration: yesterday


Severity: moderate


Modifying Factors: Improves With: nothing


Associated Symptoms: No nausea, No vomiting, No abdominal pain, No shortness of 

breath, No cough, No chills, No chest pain, No fever, No headaches, No loss of 

appetite, No syncope


Allergies/Adverse Reactions: 








Sulfa (Sulfonamide Antibiotics) Allergy (Intermediate, Verified 08/27/20 09:59)


   burn when urinating





Home Medications: 








Clopidogrel Bisulfate [Clopidogrel] 75 mg PO DAILY 04/03/18 [History]


Lactulose 20 gm PO DAILY 04/03/18 [History]





Hx Tetanus, Diphtheria Vaccination/Date Given: Yes


Hx Influenza Vaccination/Date Given: Yes


Hx Pneumococcal Vaccination/Date Given: No


Immunizations Up to Date: Yes





Travel Risk





- International Travel


Have you traveled outside of the country in past 3 weeks: No





- Coronavirus Screening


Are you exhibiting any of the following symptoms?: No


Close contact with a COVID-19 positive Pt in past 14-21 Days: No





- Review of Systems


Constitutional: No Symptoms, No Fever, No Chills


Eyes: No Symptoms


Ears, Nose, & Throat: No Symptoms


Respiratory: No Symptoms, No Cough, No Dyspnea


Cardiac: No Symptoms, No Chest Pain, No Edema, No Syncope


Abdominal/Gastrointestinal: No Symptoms, No Abdominal Pain, No Nausea, No 

Vomiting, No Diarrhea


Genitourinary Symptoms: No Symptoms, No Dysuria


Musculoskeletal: No Symptoms, No Back Pain, No Neck Pain


Skin: No Symptoms, No Rash


Neurological: No Symptoms, No Dizziness, No Focal Weakness, No Sensory Changes


Psychological: No Symptoms


Endocrine: No Symptoms


Hematologic/Lymphatic: No Symptoms


Immunological/Allergic: No Symptoms


All Other Systems: Reviewed and Negative





- Past Medical History


Pertinent Past Medical History: Yes


Neurological History: Stroke


ENT History: No Pertinent History


Cardiac History: No Pertinent History


Respiratory History: No Pertinent History


Endocrine Medical History: No Pertinent History


Musculoskeletal History: Arthritis


GI Medical History: Other


 History: No Pertinent History


Psycho-Social History: Anxiety


Male Reproductive Disorders: No Pertinent History


Other Medical History: constipation, FAILED STRESS TEST 7/2020.  patient is a 

poor historian





- Past Surgical History


Past Surgical History: No





- Social History


Smoking Status: Former smoker


Exposure to second hand smoke: No


Drug Use: none


Patient Lives Alone: Yes





- Nursing Vital Signs


Nursing Vital Signs: 


                               Initial Vital Signs











Temperature  98.6 F   08/27/20 09:51


 


Pulse Rate  75   08/27/20 09:51


 


Respiratory Rate  15   08/27/20 09:51


 


O2 Sat by Pulse Oximetry  98   08/27/20 09:51








                                   Pain Scale











Pain Intensity                 3

















- Physical Exam


General Appearance: no apparent distress, alert


Eye Exam: PERRL/EOMI, eyes nml inspection


Ears, Nose, Throat Exam: normal ENT inspection, TMs normal, pharynx normal, mo

ist mucous membranes


Neck Exam: normal inspection, non-tender, supple, full range of motion


Respiratory Exam: normal breath sounds, lungs clear, No respiratory distress


Cardiovascular Exam: regular rate/rhythm, normal heart sounds, normal peripheral

 pulses


Gastrointestinal/Abdomen Exam: soft, normal bowel sounds, No tenderness, No mass


Back Exam: normal inspection, normal range of motion, No CVA tenderness, No 

vertebral tenderness


Extremity Exam: normal inspection, normal range of motion, pelvis stable


Neurologic Exam: alert, oriented x 3, cooperative, normal mood/affect, nml 

cerebellar function, nml station & gait, sensation nml, No motor deficits


Skin Exam: normal color, warm, dry, No rash


Lymphatic Exam: No adenopathy


**SpO2 Interpretation**: normal


SpO2: 98


O2 Delivery: Room Air





- Course


Nursing assessment & vital signs reviewed: Yes


EKG Interpreted by Me: RATE ([id number]), Sinus Rhythm, NORMAL AXIS, NORMAL INTERVALS


Ordered Tests: 


                               Active Orders 24 hr











 Category Date Time Status


 


 Cardiac Monitor STAT Care  08/27/20 09:58 Active


 


 EKG-ER Only STAT Care  08/27/20 09:57 Active


 


 IV Insertion STAT Care  08/27/20 09:57 Active


 


 Pulse Oximetry (ED) STAT Care  08/27/20 09:57 Active


 


 CBC W DIFF Stat Lab  08/27/20 10:10 Completed


 


 CMP Stat Lab  08/27/20 10:10 Completed


 


 MAGNESIUM Stat Lab  08/27/20 10:10 Completed


 


 TROPONIN Q3H Lab  08/27/20 10:10 Completed


 


 TROPONIN Q3H Lab  08/27/20 13:00 Ordered


 


 TROPONIN Q3H Lab  08/27/20 16:00 Ordered


 


 TROPONIN Q3H Lab  08/27/20 19:00 Ordered


 


 TROPONIN Q3H Lab  08/27/20 22:00 Ordered


 


 UA W/RFX UR CULTURE Stat Lab  08/27/20 10:18 Completed


 


 Transfer Order Routine Transfer  08/27/20 Ordered








Medication Summary











Generic Name Dose Route Start Last Admin





  Trade Name Freq  PRN Reason Stop Dose Admin


 


Sodium Chloride  1,000 mls @ 100 mls/hr  08/27/20 10:00  08/27/20 10:14





  Sodium Chloride 0.9% 1000 Ml  IV  09/26/20 09:59  100 mls/hr





  .Q10H SHERON   Administration











Lab/Rad Data: 


                           Laboratory Result Diagrams





                                 08/27/20 10:10 





                                 08/27/20 10:10 





                               Laboratory Results











  08/27/20 08/27/20 08/27/20 Range/Units





  10:18 10:10 10:10 


 


WBC     (4.0-10.5)  K/mm3


 


RBC     (4.1-5.6)  M/mm3


 


Hgb     (12.5-18.0)  gm/dl


 


Hct     (42-50)  %


 


MCV     ()  fl


 


MCH     (26-32)  pg


 


MCHC     (32-36)  g/dl


 


RDW     (11.5-14.0)  %


 


Plt Count     (150-450)  K/mm3


 


MPV     (7.5-11.0)  fl


 


Gran %     (36.0-66.0)  %


 


Eos # (Auto)     (0-0.5)  


 


Absolute Lymphs (auto)     (1.0-4.6)  


 


Absolute Monos (auto)     (0.0-1.3)  


 


Lymphocytes %     (24.0-44.0)  %


 


Monocytes %     (0.0-12.0)  %


 


Eosinophils %     (0.00-5.0)  %


 


Basophils %     (0.0-0.4)  %


 


Absolute Granulocytes     (1.4-6.9)  


 


Basophils #     (0-0.4)  


 


Sodium    126 L  (137-145)  mmol/L


 


Potassium    4.1  (3.5-5.1)  mmol/L


 


Chloride    91 L  ()  mmol/L


 


Carbon Dioxide    25  (22-30)  mmol/L


 


Anion Gap    13.2  (5-15)  MEQ/L


 


BUN    5 L  (9-20)  mg/dL


 


Creatinine    0.61 L  (0.66-1.25)  mg/dL


 


Estimated GFR    > 60.0  ML/MIN


 


Glucose    109 H  ()  mg/dL


 


Calcium    8.9  (8.4-10.2)  mg/dL


 


Magnesium    2.1  (1.6-2.3)  mg/dL


 


Total Bilirubin    0.80  (0.2-1.3)  mg/dL


 


AST    39  (17-59)  U/L


 


ALT    30  (0-50)  U/L


 


Alkaline Phosphatase    58  ()  U/L


 


Troponin I   < 0.012   (0.000-0.034)  ng/mL


 


Serum Total Protein    7.2  (6.3-8.2)  g/dL


 


Albumin    4.6  (3.5-5.0)  g/dL


 


Urine Color  YELLOW    (YELLOW)  


 


Urine Appearance  CLEAR    (CLEAR)  


 


Urine pH  7.0    (5-6)  


 


Ur Specific Gravity  1.010    (1.005-1.025)  


 


Urine Protein  NEGATIVE    (Negative)  


 


Urine Ketones  NEGATIVE    (NEGATIVE)  


 


Urine Blood  NEGATIVE    (0-5)  Kg/ul


 


Urine Nitrite  NEGATIVE    (NEGATIVE)  


 


Urine Bilirubin  NEGATIVE    (NEGATIVE)  


 


Urine Urobilinogen  2    (0-1)  mg/dL


 


Ur Leukocyte Esterase  NEGATIVE    (NEGATIVE)  


 


Urine WBC (Auto)  NONE    (0-5)  /HPF


 


Urine RBC (Auto)  NONE    (0-2)  /HPF


 


U Epithel Cells (Auto)  NONE    (FEW)  /HPF


 


Urine Bacteria (Auto)  NONE    (NEGATIVE)  /HPF


 


Urine Mucus (Auto)  SLIGHT    (NEGATIVE)  /HPF


 


Urine Culture Reflexed  NO    (NO)  


 


Urine Glucose  NEGATIVE    (NEGATIVE)  mg/dL














  08/27/20 Range/Units





  10:10 


 


WBC  7.7  (4.0-10.5)  K/mm3


 


RBC  4.33  (4.1-5.6)  M/mm3


 


Hgb  13.1  (12.5-18.0)  gm/dl


 


Hct  38.0 L  (42-50)  %


 


MCV  87.8  ()  fl


 


MCH  30.3  (26-32)  pg


 


MCHC  34.5  (32-36)  g/dl


 


RDW  11.8  (11.5-14.0)  %


 


Plt Count  154  (150-450)  K/mm3


 


MPV  11.2 H  (7.5-11.0)  fl


 


Gran %  84.3 H  (36.0-66.0)  %


 


Eos # (Auto)  0.01  (0-0.5)  


 


Absolute Lymphs (auto)  0.66 L  (1.0-4.6)  


 


Absolute Monos (auto)  0.52  (0.0-1.3)  


 


Lymphocytes %  8.5 L  (24.0-44.0)  %


 


Monocytes %  6.7  (0.0-12.0)  %


 


Eosinophils %  0.1  (0.00-5.0)  %


 


Basophils %  0.4  (0.0-0.4)  %


 


Absolute Granulocytes  6.50  (1.4-6.9)  


 


Basophils #  0.03  (0-0.4)  


 


Sodium   (137-145)  mmol/L


 


Potassium   (3.5-5.1)  mmol/L


 


Chloride   ()  mmol/L


 


Carbon Dioxide   (22-30)  mmol/L


 


Anion Gap   (5-15)  MEQ/L


 


BUN   (9-20)  mg/dL


 


Creatinine   (0.66-1.25)  mg/dL


 


Estimated GFR   ML/MIN


 


Glucose   ()  mg/dL


 


Calcium   (8.4-10.2)  mg/dL


 


Magnesium   (1.6-2.3)  mg/dL


 


Total Bilirubin   (0.2-1.3)  mg/dL


 


AST   (17-59)  U/L


 


ALT   (0-50)  U/L


 


Alkaline Phosphatase   ()  U/L


 


Troponin I   (0.000-0.034)  ng/mL


 


Serum Total Protein   (6.3-8.2)  g/dL


 


Albumin   (3.5-5.0)  g/dL


 


Urine Color   (YELLOW)  


 


Urine Appearance   (CLEAR)  


 


Urine pH   (5-6)  


 


Ur Specific Gravity   (1.005-1.025)  


 


Urine Protein   (Negative)  


 


Urine Ketones   (NEGATIVE)  


 


Urine Blood   (0-5)  Kg/ul


 


Urine Nitrite   (NEGATIVE)  


 


Urine Bilirubin   (NEGATIVE)  


 


Urine Urobilinogen   (0-1)  mg/dL


 


Ur Leukocyte Esterase   (NEGATIVE)  


 


Urine WBC (Auto)   (0-5)  /HPF


 


Urine RBC (Auto)   (0-2)  /HPF


 


U Epithel Cells (Auto)   (FEW)  /HPF


 


Urine Bacteria (Auto)   (NEGATIVE)  /HPF


 


Urine Mucus (Auto)   (NEGATIVE)  /HPF


 


Urine Culture Reflexed   (NO)  


 


Urine Glucose   (NEGATIVE)  mg/dL














- Progress


Progress: improved


Progress Note: 





08/27/20 11:23


6-year-old male presents to our ED with complaints of generalized weakness.  

Recently discharged from our hospital due to hyponatremia.  Hyponatremia was 

addressed with modifications of home medications blood pressure medication.  

Work-up reveals hyponatremia.  Chest x-ray performed 3 days ago was essentially 

unremarkable.  In light of patient's ongoing symptoms and worsening hyponatremia

 we will admit to observation.  Case discussed with Dr. Silva covering Dr. Hartmann Is admission to observation.  Plan of care discussed with patient.  He 

agrees to admission to [facility] for further evaluation

 and treatment.  Normal saline infusion initiated.  Nephrology consult initiated

 as well.


Discussed with : Andrea


Will see patient in: hospital (observation)


Counseled pt/family regarding: lab results, diagnosis, rad results





- Departure


Departure Disposition: Observation


Clinical Impression: 


 Hyponatremia, Generalized weakness, Low blood urea nitrogen (BUN)





Condition: Stable


Critical Care Time: No


Referrals: 


OMAR PORTILLO [Primary Care Provider] -

## 2020-08-28 LAB
ALBUMIN SERPL-MCNC: 4.6 G/DL (ref 3.5–5)
ALP SERPL-CCNC: 54 U/L (ref 38–126)
ALT SERPL-CCNC: 29 U/L (ref 0–50)
ANION GAP SERPL CALC-SCNC: 11.7 MEQ/L (ref 5–15)
ANION GAP SERPL CALC-SCNC: 13 MEQ/L (ref 5–15)
AST SERPL QL: 35 U/L (ref 17–59)
BASOPHILS # BLD AUTO: 0.02 10*3/UL (ref 0–0.4)
BASOPHILS NFR BLD AUTO: 0.3 % (ref 0–0.4)
BILIRUB BLD-MCNC: 0.8 MG/DL (ref 0.2–1.3)
BUN SERPL-MCNC: 4 MG/DL (ref 9–20)
BUN SERPL-MCNC: 4 MG/DL (ref 9–20)
CALCIUM SPEC-MCNC: 8.9 MG/DL (ref 8.4–10.2)
CALCIUM SPEC-MCNC: 8.9 MG/DL (ref 8.4–10.2)
CHLORIDE SERPL-SCNC: 96 MMOL/L (ref 98–107)
CHLORIDE SERPL-SCNC: 96 MMOL/L (ref 98–107)
CO2 SERPL-SCNC: 25 MMOL/L (ref 22–30)
CO2 SERPL-SCNC: 25 MMOL/L (ref 22–30)
CREAT SERPL-MCNC: 0.56 MG/DL (ref 0.66–1.25)
CREAT SERPL-MCNC: 0.61 MG/DL (ref 0.66–1.25)
EOSINOPHIL # BLD AUTO: 0.03 10*3/UL (ref 0–0.5)
GFR SERPLBLD BASED ON 1.73 SQ M-ARVRAT: > 60 ML/MIN
GFR SERPLBLD BASED ON 1.73 SQ M-ARVRAT: > 60 ML/MIN
GLUCOSE SERPL-MCNC: 125 MG/DL (ref 74–106)
GLUCOSE SERPL-MCNC: 94 MG/DL (ref 74–106)
HCT VFR BLD AUTO: 40.3 % (ref 42–50)
HGB BLD-MCNC: 14 GM/DL (ref 12.5–18)
LYMPHOCYTES # SPEC AUTO: 1.11 10*3/UL (ref 1–4.6)
MCH RBC QN AUTO: 30.7 PG (ref 26–32)
MCHC RBC AUTO-ENTMCNC: 34.7 G/DL (ref 32–36)
MONOCYTES # BLD AUTO: 0.63 10*3/UL (ref 0–1.3)
PLATELET # BLD AUTO: 161 K/MM3 (ref 150–450)
POTASSIUM SERPLBLD-SCNC: 3.9 MMOL/L (ref 3.5–5.1)
POTASSIUM SERPLBLD-SCNC: 4.4 MMOL/L (ref 3.5–5.1)
PROT SERPL-MCNC: 7.2 G/DL (ref 6.3–8.2)
RBC # BLD AUTO: 4.56 M/MM3 (ref 4.1–5.6)
SODIUM SERPL-SCNC: 129 MMOL/L (ref 137–145)
SODIUM SERPL-SCNC: 130 MMOL/L (ref 137–145)
VIT B12 SERPL-MCNC: 274 PG/ML (ref 239–931)
WBC # BLD AUTO: 7.8 K/MM3 (ref 4–10.5)

## 2020-08-28 RX ADMIN — CLOPIDOGREL BISULFATE SCH MG: 75 TABLET ORAL at 09:20

## 2020-08-28 RX ADMIN — LACTULOSE SCH GM: 10 SOLUTION ORAL at 09:20

## 2020-08-28 RX ADMIN — SODIUM CHLORIDE PRN MLS/HR: 9 INJECTION, SOLUTION INTRAVENOUS at 14:53

## 2020-08-28 NOTE — PCM.HP
History of Present Illness





- Chief Complaint


Chief Complaint: hyponatremia. generalized weakness.


Date: 08/28/20


History of Present Illness: 


 is a 60 year old male seen this am following ER admission for 

hyponatremia. Patient reports that he was discharged one day prior. He states 

that he actually was not feeling well enough to go home but had made 

arrangements for a ride and thought he would be ok so did not want to say 

anything at the time. Patient reports that he went home and was still feeling 

unwell. Patient reports that he came back to ER the following day. He was 

reporting similar symptoms of feeling weak. He reports that he has been taking a

medication to help with his bowels and was having some diarrhea prior to the 

first admission. Patient reports constipation now in hospital. Patient reports 

that he drinks beer daily and states that he drinks a lot of water as well. He 

denies any issues with his sodium before. He had no other reported concerns at 

this time. He would like to go home today.








- Review of Systems


Constitutional: Weakness





Medications & Allergies


Home Medications: 


                              Home Medication List





Clopidogrel Bisulfate [Clopidogrel] 75 mg PO DAILY 04/03/18 [History Confirmed 

08/27/20]


Lactulose 20 gm PO DAILY 04/03/18 [History Confirmed 08/27/20]


Sodium Chloride 1 gm PO TID 10 Days #30 tablet 08/26/20 [Rx Confirmed 08/27/20]








Allergies/Adverse Reactions: 


                                    Allergies











Allergy/AdvReac Type Severity Reaction Status Date / Time


 


Sulfa (Sulfonamide Allergy Intermediate burn when Verified 08/27/20 09:59





Antibiotics)   urinating  














- Past Medical History


Past Medical History: Yes


Neurological History: Stroke


ENT History: No Pertinent History


Cardiac History: No Pertinent History


Respiratory History: No Pertinent History


Endocrine Medical History: No Pertinent History


Musculoskelatal History: Arthritis


GI Medical History: Other


 History: No Pertinent History


Pyscho-Social History: Anxiety


Male Reproductive Disorders: No Pertinent History


Comment: constipation, FAILED STRESS TEST 7/2020.  patient is a poor historian





- Past Surgical History


Past Surgical History: No





- Social History


Smoking Status: Former smoker


Exposure to second hand smoke: No


Alcohol: Daily


Drug Use: none





- Physical Exam


Vital Signs: 


                               Vital Signs - 24 hr











  Temp Pulse Resp BP Pulse Ox


 


 08/28/20 11:11  98.4 F  66  18  149/74  97


 


 08/28/20 07:53    18  


 


 08/28/20 07:31      93 L


 


 08/28/20 07:00  98 F  63  18  145/70  98


 


 08/28/20 04:00    18  


 


 08/28/20 03:47  97.9 F  67  18  151/75  98


 


 08/28/20 00:00  98.2 F  62  18  132/68  98


 


 08/27/20 19:55      97


 


 08/27/20 19:48    18  


 


 08/27/20 19:42  97.7 F  67  18  145/67  98


 


 08/27/20 16:00  97.7 F  64  18  132/74  97














Results





- Labs


Lab/Micro Results: 


                            Lab Results-Last 24 Hours











  08/27/20 08/27/20 08/28/20 Range/Units





  13:05 16:10 05:39 


 


WBC    7.8  (4.0-10.5)  K/mm3


 


RBC    4.56  (4.1-5.6)  M/mm3


 


Hgb    14.0  (12.5-18.0)  gm/dl


 


Hct    40.3 L  (42-50)  %


 


MCV    88.4  ()  fl


 


MCH    30.7  (26-32)  pg


 


MCHC    34.7  (32-36)  g/dl


 


RDW    12.1  (11.5-14.0)  %


 


Plt Count    161  (150-450)  K/mm3


 


MPV    11.5 H  (7.5-11.0)  fl


 


Gran %    76.9 H  (36.0-66.0)  %


 


Eos # (Auto)    0.03  (0-0.5)  


 


Absolute Lymphs (auto)    1.11  (1.0-4.6)  


 


Absolute Monos (auto)    0.63  (0.0-1.3)  


 


Lymphocytes %    14.3 L  (24.0-44.0)  %


 


Monocytes %    8.1  (0.0-12.0)  %


 


Eosinophils %    0.4  (0.00-5.0)  %


 


Basophils %    0.3  (0.0-0.4)  %


 


Absolute Granulocytes    5.98  (1.4-6.9)  


 


Basophils #    0.02  (0-0.4)  


 


Sodium     (137-145)  mmol/L


 


Potassium     (3.5-5.1)  mmol/L


 


Chloride     ()  mmol/L


 


Carbon Dioxide     (22-30)  mmol/L


 


Anion Gap     (5-15)  MEQ/L


 


BUN     (9-20)  mg/dL


 


Creatinine     (0.66-1.25)  mg/dL


 


Estimated GFR     ML/MIN


 


Glucose     ()  mg/dL


 


Calcium     (8.4-10.2)  mg/dL


 


Total Bilirubin     (0.2-1.3)  mg/dL


 


AST     (17-59)  U/L


 


ALT     (0-50)  U/L


 


Alkaline Phosphatase     ()  U/L


 


Troponin I  < 0.012  < 0.012   (0.000-0.034)  ng/mL


 


Serum Total Protein     (6.3-8.2)  g/dL


 


Albumin     (3.5-5.0)  g/dL














  08/28/20 08/28/20 Range/Units





  05:39 12:50 


 


WBC    (4.0-10.5)  K/mm3


 


RBC    (4.1-5.6)  M/mm3


 


Hgb    (12.5-18.0)  gm/dl


 


Hct    (42-50)  %


 


MCV    ()  fl


 


MCH    (26-32)  pg


 


MCHC    (32-36)  g/dl


 


RDW    (11.5-14.0)  %


 


Plt Count    (150-450)  K/mm3


 


MPV    (7.5-11.0)  fl


 


Gran %    (36.0-66.0)  %


 


Eos # (Auto)    (0-0.5)  


 


Absolute Lymphs (auto)    (1.0-4.6)  


 


Absolute Monos (auto)    (0.0-1.3)  


 


Lymphocytes %    (24.0-44.0)  %


 


Monocytes %    (0.0-12.0)  %


 


Eosinophils %    (0.00-5.0)  %


 


Basophils %    (0.0-0.4)  %


 


Absolute Granulocytes    (1.4-6.9)  


 


Basophils #    (0-0.4)  


 


Sodium  129 L  130 L  (137-145)  mmol/L


 


Potassium  4.4  3.9  (3.5-5.1)  mmol/L


 


Chloride  96 L  96 L  ()  mmol/L


 


Carbon Dioxide  25  25  (22-30)  mmol/L


 


Anion Gap  11.7  13.0  (5-15)  MEQ/L


 


BUN  4 L  4 L  (9-20)  mg/dL


 


Creatinine  0.61 L  0.56 L  (0.66-1.25)  mg/dL


 


Estimated GFR  > 60.0  > 60.0  ML/MIN


 


Glucose  94  125 H  ()  mg/dL


 


Calcium  8.9  8.9  (8.4-10.2)  mg/dL


 


Total Bilirubin  0.80   (0.2-1.3)  mg/dL


 


AST  35   (17-59)  U/L


 


ALT  29   (0-50)  U/L


 


Alkaline Phosphatase  54   ()  U/L


 


Troponin I    (0.000-0.034)  ng/mL


 


Serum Total Protein  7.2   (6.3-8.2)  g/dL


 


Albumin  4.6   (3.5-5.0)  g/dL














Assessment/Plan


(1) Generalized weakness


Current Visit: Yes   Status: Acute   Code(s): R53.1 - WEAKNESS   





(2) Hyponatremia


Current Visit: Yes   Status: Acute   Code(s): E87.1 - HYPO-OSMOLALITY AND HYP

ONATREMIA   





(3) Constipation


Current Visit: No   Status: Acute   


Qualifiers: 


   Constipation type: slow transit constipation   Qualified Code(s): K59.01 - 

Slow transit constipation   


Code(s): K59.00 - CONSTIPATION, UNSPECIFIED

## 2020-08-28 NOTE — XRAY
Indication: Hyponatremia.



Two-dimensional renal sonogram performed.



Comparison: None



Both kidneys normal in reniform shape with normal color perfusion.  Right

kidney measures 10.3 x 4.6 x 5.0 cm and the left measures 9.4 x 5.4 x 5.7 cm.

No focal solid/cystic renal mass origin nephrosis.  Cortical measured

differentiation is preserved without cortical thinning.  Images of the urinary

bladder normally distended and grossly unremarkable.  Normal bilateral

ureteral jets.



Impression: Negative renal sonogram.

## 2020-08-29 VITALS — SYSTOLIC BLOOD PRESSURE: 175 MMHG | HEART RATE: 68 BPM | OXYGEN SATURATION: 96 % | DIASTOLIC BLOOD PRESSURE: 95 MMHG

## 2020-08-29 LAB
ANION GAP SERPL CALC-SCNC: 9.9 MEQ/L (ref 5–15)
BUN SERPL-MCNC: 5 MG/DL (ref 9–20)
CALCIUM SPEC-MCNC: 8.4 MG/DL (ref 8.4–10.2)
CHLORIDE SERPL-SCNC: 98 MMOL/L (ref 98–107)
CO2 SERPL-SCNC: 26 MMOL/L (ref 22–30)
CREAT SERPL-MCNC: 0.63 MG/DL (ref 0.66–1.25)
GFR SERPLBLD BASED ON 1.73 SQ M-ARVRAT: > 60 ML/MIN
GLUCOSE SERPL-MCNC: 87 MG/DL (ref 74–106)
POTASSIUM SERPLBLD-SCNC: 3.9 MMOL/L (ref 3.5–5.1)
SODIUM SERPL-SCNC: 131 MMOL/L (ref 137–145)

## 2020-08-29 RX ADMIN — SODIUM CHLORIDE PRN MLS/HR: 9 INJECTION, SOLUTION INTRAVENOUS at 11:06

## 2020-08-29 RX ADMIN — LACTULOSE SCH GM: 10 SOLUTION ORAL at 09:19

## 2020-08-29 RX ADMIN — CLOPIDOGREL BISULFATE SCH MG: 75 TABLET ORAL at 09:19

## 2020-08-29 NOTE — PCM.DS
Discharge Summary


Date of Admission: 


08/27/20 11:30





Admitting Physician: 


JOHAN MCFARLANE MD





Consults: 





                                Consults on Case





08/27/20 11:56


Consult Nephrology ROUTINE 











Primary Care Provider: 


OMAR PORTILLO








Allergies


Allergies





Sulfa (Sulfonamide Antibiotics) Allergy (Intermediate, Verified 08/27/20 09:59)


   burn when urinating











Hospital Summary





- Hospital Course


Hospital Course: 








                                 Chief Complaint





Diagnosis                        hyponatremia. generalized weakness.





                                 Admission Date





Date                             08/28/20





                                    Allergies











Allergy/AdvReac Type Severity Reaction Status Date / Time


 


Sulfa (Sulfonamide Allergy Intermediate burn when Verified 08/27/20 09:59





Antibiotics)   urinating  








                           Vital Signs (Last 24 hours)











  Temp Pulse Resp BP Pulse Ox


 


 08/29/20 11:45  98.5 F  68  16  175/95  96


 


 08/29/20 08:00  97.8 F  77  16  138/74  95


 


 08/29/20 07:37      96


 


 08/29/20 04:00  98.5 F  63  18  134/75  96


 


 08/29/20 00:00    20  


 


 08/28/20 23:36  98.4 F  70  20  152/77  96


 


 08/28/20 21:44      95


 


 08/28/20 20:00    20  


 


 08/28/20 19:47  98.3 F  63  20  164/77  96


 


 08/28/20 16:00  98.4 F  74  16  142/74  95








                               Current Medications











Generic Name Dose Route Start Last Admin





  Trade Name Freq  PRN Reason Stop Dose Admin


 


Clopidogrel Bisulfate  75 mg  08/27/20 15:00  08/29/20 09:19





  Plavix 75 Mg Tablet***  PO  09/26/20 14:59  75 mg





  DAILY SHERON   Administration


 


Sodium Chloride  1,000 mls @ 100 mls/hr  08/27/20 11:37  08/29/20 00:31





  Sodium Chloride 0.9% 1000 Ml  IV  09/26/20 11:36  100 mls/hr





  .Q10H SHERON   Administration


 


Thiamine HCl 100 mg/  1,000 mls @ 100 mls/hr  08/28/20 14:00  08/29/20 11:06





Multivitamins/Minerals 10 ml/  IV  09/27/20 13:59  100 mls/hr





Folic Acid 1 mg/ Sodium  .Q10H PRN   Administration





Chloride   


 


Lactulose  20 gm  08/27/20 15:00  08/29/20 09:19





  Lactulose 20 Gm/30ml Ud Cup***  PO  09/26/20 14:59  20 gm





  DAILY SHERON   Administration


 


Morphine Sulfate  2 mg  08/27/20 11:37 





  Morphine Sulfate 2 Mg Inj***  IV  09/01/20 11:36 





  Q4H PRN PRN  





  PAIN  


 


Sodium Chloride 1gm  1 gm  08/27/20 14:30  08/29/20 09:19





  Tablet  PO  09/26/20 14:29  1 gm





  TIDWM SHERON   Administration


 


Ondansetron HCl  4 mg  08/27/20 11:37 





  Zofran 4 Mg/2 Ml Vial**  IV  09/26/20 11:36 





  Q6H PRN PRN  





  NAUSEA/VOMITING  














Discontinued Medications














Generic Name Dose Route Start Last Admin





  Trade Name Freq  PRN Reason Stop Dose Admin


 


Sodium Chloride  1,000 mls @ 100 mls/hr  08/27/20 10:00  08/27/20 10:14





  Sodium Chloride 0.9% 1000 Ml  IV  09/26/20 09:59  100 mls/hr





  .Q10H SHERON   Administration








                         Intake & Output (Last 24 hours)











 08/26/20 08/27/20 08/28/20 08/29/20





 11:59 11:59 11:59 11:59


 


Intake Total   3543 4295


 


Output Total   3900 2900


 


Balance   -357 1395


 


Weight  71.8 kg  








                       Laboratory Results (Last 24 hours)











  08/29/20 08/28/20 08/28/20





  06:28 17:30 12:50


 


Sodium  131 L  


 


Potassium  3.9  


 


Chloride  98  


 


Carbon Dioxide  26  


 


Anion Gap  9.9  


 


BUN  5 L  


 


Creatinine  0.63 L  


 


Estimated GFR  > 60.0  


 


Glucose  87  


 


Calcium  8.4  


 


Vitamin B12    274


 


Folic Acid    8.53


 


Urine Sodium   61 














  08/28/20





  12:50


 


Sodium  130 L


 


Potassium  3.9


 


Chloride  96 L


 


Carbon Dioxide  25


 


Anion Gap  13.0


 


BUN  4 L


 


Creatinine  0.56 L


 


Estimated GFR  > 60.0


 


Glucose  125 H


 


Calcium  8.9


 


Vitamin B12 


 


Folic Acid 


 


Urine Sodium 








                             Orders (Last 24 hours)











 Category Date Time Status


 


 Renal Ultrasound [KIDNEY] [US] Routine Exams  08/28/20 14:13 Completed


 


 BMP Stat Lab  08/28/20 12:50 Completed


 


 BMP Urgent Lab  08/29/20 06:28 Completed


 


 Folate (Folic Acid) Stat Lab  08/28/20 12:50 Completed


 


 OSMOLALITY SERUM Routine Lab  08/28/20 16:30 Received


 


 OSMOLALITY URINE Routine Lab  08/28/20 16:30 Received


 


 Sodium, Urine Routine Lab  08/28/20 17:30 Completed


 


 Vitamin B1(Thiamine) Stat Lab  08/28/20 13:05 Received


 


 Vitamin B12 Stat Lab  08/28/20 12:50 Completed


 


 NaCl 0.9% 1000 ml [Sodium Chloride 0.9% 1000 ML] 1,000 Med  08/28/20 14:00 

Active





 ml   





 Thiamine HCl 200 mg/2 ml*** [Thiamine 200 mg/2 ml***]   





 100 mg   





 Multivitamins 10 ml*** [Vitamins For Infusion 10 ML   





 INJECTION***] 10 ml   





 Folic Acid Inj** [Folnate 5 mg/ml 10 ml Vial**] 1 mg   





  mls/hr   








                       Patient Care Notes (Last 24 hours)





08/29/20 11:46 CNA Note by RHETT GILL


pt was up walking around before B/P was taken. 





Initialized on 08/29/20 11:46 - END OF NOTE








08/28/20 17:56 Nursing Note by Demetrice Anand


I faxed patients charts to date 8/28/20 to .





Initialized on 08/28/20 17:56 - END OF NOTE

















- Vitals & Intake/Output


Vital Signs: 





                                   Vital Signs











Temperature  98.5 F   08/29/20 11:45


 


Pulse Rate  68   08/29/20 11:45


 


Respiratory Rate  16   08/29/20 11:45


 


Blood Pressure  175/95   08/29/20 11:45


 


O2 Sat by Pulse Oximetry  96   08/29/20 11:45











Intake & Output: 





                                 Intake & Output











 08/26/20 08/27/20 08/28/20 08/29/20





 11:59 11:59 11:59 11:59


 


Intake Total   3543 4295


 


Output Total   3900 2900


 


Balance   -357 1395


 


Weight  71.8 kg  














- Lab


Result Diagrams: 


                                 08/28/20 05:39





                                 08/29/20 06:28


Lab Results-Last 24 Hrs: 





                            Lab Results-Last 24 Hours











  08/28/20 08/28/20 08/28/20 Range/Units





  12:50 12:50 17:30 


 


Sodium  130 L    (137-145)  mmol/L


 


Potassium  3.9    (3.5-5.1)  mmol/L


 


Chloride  96 L    ()  mmol/L


 


Carbon Dioxide  25    (22-30)  mmol/L


 


Anion Gap  13.0    (5-15)  MEQ/L


 


BUN  4 L    (9-20)  mg/dL


 


Creatinine  0.56 L    (0.66-1.25)  mg/dL


 


Estimated GFR  > 60.0    ML/MIN


 


Glucose  125 H    ()  mg/dL


 


Calcium  8.9    (8.4-10.2)  mg/dL


 


Vitamin B12   274   (239-931)  pg/mL


 


Folic Acid   8.53   (2.76 - >20)  ng/mL


 


Urine Sodium    61  (30-90)  mmol/L














  08/29/20 Range/Units





  06:28 


 


Sodium  131 L  (137-145)  mmol/L


 


Potassium  3.9  (3.5-5.1)  mmol/L


 


Chloride  98  ()  mmol/L


 


Carbon Dioxide  26  (22-30)  mmol/L


 


Anion Gap  9.9  (5-15)  MEQ/L


 


BUN  5 L  (9-20)  mg/dL


 


Creatinine  0.63 L  (0.66-1.25)  mg/dL


 


Estimated GFR  > 60.0  ML/MIN


 


Glucose  87  ()  mg/dL


 


Calcium  8.4  (8.4-10.2)  mg/dL


 


Vitamin B12   (239-931)  pg/mL


 


Folic Acid   (2.76 - >20)  ng/mL


 


Urine Sodium   (30-90)  mmol/L














- Radiology Exams


Ordered Rad Exams-Entire Visit: 





                              Radiology Procedures











 Category Date Time Status


 


 Renal Ultrasound [KIDNEY] [US] Routine Exams  08/28/20 14:13 Completed














Discharge Exam


General Appearance: no apparent distress, alert


Neurologic Exam: alert, oriented x 3, cooperative, normal mood/affect, nml 

cerebellar function, sensation nml, No motor deficits


Eye Exam: PERRL, EOMI, eyes nml inspection


Ears, Nose, Throat Exam: normal ENT inspection, pharynx normal, moist mucous 

membranes


Neck Exam: normal inspection, non-tender, supple, full range of motion


Respiratory Exam: normal breath sounds, lungs clear, No respiratory distress


Cardiovascular Exam: regular rate/rhythm, normal heart sounds


Gastrointestinal/Abdomen Exam: soft, No tenderness, No mass


Male Genitalia Exam: deferred


Rectal Exam: deferred


Back Exam: normal inspection, normal range of motion, No CVA tenderness, No 

vertebral tenderness


Extremity Exam: normal inspection, normal range of motion


Skin Exam: normal color, warm, dry





Final Diagnosis/Problem List





- Final Discharge Diagnosis/Problem


(1) Generalized weakness


Current Visit: Yes   Status: Resolved   Code(s): R53.1 - WEAKNESS   





(2) Hyponatremia


Current Visit: Yes   Status: Resolved   Code(s): E87.1 - HYPO-OSMOLALITY AND 

HYPONATREMIA   





- Discharge


Discharge Date: 08/29/20


Disposition: Home, Self-Care


Condition: Stable


Prescriptions: 


Continue


   Lactulose 20 gm PO DAILY


   Clopidogrel Bisulfate [Clopidogrel] 75 mg PO DAILY


   Sodium Chloride 1 gm PO TID 10 Days #30 tablet


Follow up with: 


WALKER SANFORD [CONSULTING PHYSICIAN] - 08/31/20 2:50 pm


OMAR PORTILLO [Primary Care Provider] - 1 Week

## 2020-08-29 NOTE — PCM.NOTE
Date and Time: 08/29/20 0809





Subjective Assessment: 





doing better, eating well





- Review of Systems


Constitutional: Weakness, No Fever, No Chills


Eyes: No Symptoms


Ears, Nose, & Throat: No Symptoms


Respiratory: No Cough, No Short Of Breath


Cardiac: No Chest Pain, No Edema, No Syncope


Abdominal/Gastrointestinal: No Abdominal Pain, No Nausea, No Vomiting, No 

Diarrhea


Genitourinary Symptoms: No Dysuria


Musculoskeletal: No Back Pain, No Neck Pain


Skin: No Rash


Neurological: No Dizziness, No Focal Weakness, No Sensory Changes


Psychological: No Symptoms


Endocrine: No Symptoms


Hematologic/Lymphatic: No Symptoms


Immunological/Allergic: No Symptoms





Objective Exam


General Appearance: no apparent distress, alert


Neurologic Exam: alert, oriented x 3, cooperative, normal mood/affect, nml 

cerebellar function, sensation nml, No motor deficits


Skin Exam: normal color, warm, dry


Eye Exam: PERRL, EOMI, eyes nml inspection


Ears, Nose, Throat Exam: normal ENT inspection, pharynx normal, moist mucous 

membranes


Neck Exam: normal inspection, non-tender, supple, full range of motion


Respiratory Exam: normal breath sounds, lungs clear, No respiratory distress


Cardiovascular Exam: regular rate/rhythm, normal heart sounds


Gastrointestinal/Abdomen Exam: soft, No tenderness, No mass


Extremity Exam: normal inspection, normal range of motion


Back Exam: normal inspection, normal range of motion, No CVA tenderness, No 

vertebral tenderness


Male Genitalia Exam: deferred


Rectal Exam: deferred





OBJECTIVE DATA


Vital Signs: 


                               Vital Signs - 24 hr











  Temp Pulse Resp BP Pulse Ox


 


 08/29/20 07:37      96


 


 08/29/20 04:00  98.5 F  63  18  134/75  96


 


 08/29/20 00:00    20  


 


 08/28/20 23:36  98.4 F  70  20  152/77  96


 


 08/28/20 21:44      95


 


 08/28/20 20:00    20  


 


 08/28/20 19:47  98.3 F  63  20  164/77  96


 


 08/28/20 16:00  98.4 F  74  16  142/74  95


 


 08/28/20 11:11  98.4 F  66  18  149/74  97








                        Pain Assessment - Last Documented











Pain Intensity                 0


 


Pain Scale Used                FLACC











Intake and Output: 


                                 Intake & Output











 08/26/20 08/27/20 08/28/20 08/29/20





 11:59 11:59 11:59 11:59


 


Intake Total   4056 3218


 


Output Total   8135 7441


 


Balance   -357 775


 


Weight  71.8 kg  











Lab Results: 


                            Lab Results-Last 24 Hours











  08/28/20 08/28/20 08/28/20 Range/Units





  12:50 12:50 17:30 


 


Sodium  130 L    (137-145)  mmol/L


 


Potassium  3.9    (3.5-5.1)  mmol/L


 


Chloride  96 L    ()  mmol/L


 


Carbon Dioxide  25    (22-30)  mmol/L


 


Anion Gap  13.0    (5-15)  MEQ/L


 


BUN  4 L    (9-20)  mg/dL


 


Creatinine  0.56 L    (0.66-1.25)  mg/dL


 


Estimated GFR  > 60.0    ML/MIN


 


Glucose  125 H    ()  mg/dL


 


Calcium  8.9    (8.4-10.2)  mg/dL


 


Vitamin B12   274   (239-931)  pg/mL


 


Folic Acid   8.53   (2.76 - >20)  ng/mL


 


Urine Sodium    61  (30-90)  mmol/L














  08/29/20 Range/Units





  06:28 


 


Sodium  131 L  (137-145)  mmol/L


 


Potassium  3.9  (3.5-5.1)  mmol/L


 


Chloride  98  ()  mmol/L


 


Carbon Dioxide  26  (22-30)  mmol/L


 


Anion Gap  9.9  (5-15)  MEQ/L


 


BUN  5 L  (9-20)  mg/dL


 


Creatinine  0.63 L  (0.66-1.25)  mg/dL


 


Estimated GFR  > 60.0  ML/MIN


 


Glucose  87  ()  mg/dL


 


Calcium  8.4  (8.4-10.2)  mg/dL


 


Vitamin B12   (239-931)  pg/mL


 


Folic Acid   (2.76 - >20)  ng/mL


 


Urine Sodium   (30-90)  mmol/L











Radiology Exams: 


                              Radiology Procedures











 Category Date Time Status


 


 Renal Ultrasound [KIDNEY] [US] Routine Exams  08/28/20 14:13 Completed














Assessment/Plan


(1) Generalized weakness


Current Visit: Yes   Status: Acute   Code(s): R53.1 - WEAKNESS   





(2) Hyponatremia


Current Visit: Yes   Status: Acute   Code(s): E87.1 - HYPO-OSMOLALITY AND 

HYPONATREMIA

## 2020-08-31 LAB
OSMOLALITY SERPL: 271 MOSM/KG (ref 282–304)
OSMOLALITY UR: 158 MOSM/KG

## 2021-03-07 NOTE — DS
DISCHARGE DIAGNOSES: 

1) HYPONATREMIA. 

2) SYNCOPAL EPISODE. 



HOSPITAL COURSE:  The patient is a 60 year old white male patient who presented to the 
emergency room. He recently has been under a cardiac work up for chest pain. He has seen 
Dr. Pagan recently. His medications were changed to include hydrochlorothiazide. The 
patient was seen in the [place] emergency room and released after a work up showing him 
to be negative for myocardial infarction. However the patient upon his trip home began 
feeling weak and actually had an event what sounds like it was syncopal but may have been 
seizure based on the description of eyes rolling back in head. The patient awoke to his 
son yelling at him. He was brought to our emergency room where he was found to have a 
sodium of 121 and was admitted to the hospital for evaluation and management.  The patient 
did have a tele-neurology consult which essentially suggested fluid restriction. We did 
obtain an electroencephalogram which essential findings within range of normal variation 
for age showing no seizure focus. The patient underwent fluid restriction, salt tablets 
and IV saline at 50 cc/hour. By the next morning his sodium was 128. His electrolytes 
otherwise were normal. His glucose 91, BUN 8, creatinine 0.65. White count was 11,000, 
hemoglobin 9.3, PLT count 415,000. The patient did have CT scan of the head which showed a 
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 
abnormality. The patient was felt to be ready for discharge home. At this time he is to 
continue with fluid restriction of 2,000 cc through the day. He was to discontinue his 
hydrochlorothiazide medication recently given to him by Dr. Pagan and follow up with Dr. Hernandez in her office in one week for recheck on his electrolytes. Patient

## 2022-01-27 ENCOUNTER — HOSPITAL ENCOUNTER (EMERGENCY)
Dept: HOSPITAL 33 - ED | Age: 62
Discharge: HOME | End: 2022-01-27
Payer: MEDICARE

## 2022-01-27 VITALS — HEART RATE: 79 BPM

## 2022-01-27 VITALS — OXYGEN SATURATION: 100 %

## 2022-01-27 VITALS — SYSTOLIC BLOOD PRESSURE: 138 MMHG | DIASTOLIC BLOOD PRESSURE: 78 MMHG

## 2022-01-27 DIAGNOSIS — R50.9: ICD-10-CM

## 2022-01-27 DIAGNOSIS — R53.1: ICD-10-CM

## 2022-01-27 DIAGNOSIS — R55: ICD-10-CM

## 2022-01-27 DIAGNOSIS — R19.7: ICD-10-CM

## 2022-01-27 DIAGNOSIS — R53.83: ICD-10-CM

## 2022-01-27 DIAGNOSIS — U07.1: Primary | ICD-10-CM

## 2022-01-27 LAB
ALBUMIN SERPL-MCNC: 4.7 G/DL (ref 3.5–5)
ALP SERPL-CCNC: 76 U/L (ref 38–126)
ALT SERPL-CCNC: 30 U/L (ref 0–50)
ANION GAP SERPL CALC-SCNC: 14.7 MEQ/L (ref 5–15)
AST SERPL QL: 34 U/L (ref 17–59)
BASOPHILS # BLD AUTO: 0 10*3/UL (ref 0–0.4)
BILIRUB BLD-MCNC: 0.5 MG/DL (ref 0.2–1.3)
BLD SMEAR INTERP: YES
BUN SERPL-MCNC: 4 MG/DL (ref 9–20)
CALCIUM SPEC-MCNC: 8.9 MG/DL (ref 8.4–10.2)
CHLORIDE SERPL-SCNC: 93 MMOL/L (ref 98–107)
CO2 SERPL-SCNC: 27 MMOL/L (ref 22–30)
COVID AG -BINAX NOW RAPID TEST: POSITIVE
CREAT SERPL-MCNC: 0.67 MG/DL (ref 0.66–1.25)
EOSINOPHIL # BLD AUTO: 0 10*3/UL (ref 0–0.5)
GFR SERPLBLD BASED ON 1.73 SQ M-ARVRAT: > 60 ML/MIN
GLUCOSE SERPL-MCNC: 108 MG/DL (ref 74–106)
GLUCOSE UR-MCNC: NEGATIVE MG/DL
HCT VFR BLD AUTO: 43.5 % (ref 42–50)
HGB BLD-MCNC: 15 GM/DL (ref 12.5–18)
LYMPHOCYTES # SPEC AUTO: 0.43 10*3/UL (ref 1–4.6)
MCH RBC QN AUTO: 30.4 PG (ref 26–32)
MCHC RBC AUTO-ENTMCNC: 34.5 G/DL (ref 32–36)
MONOCYTES # BLD AUTO: 0.33 10*3/UL (ref 0–1.3)
PLATELET # BLD AUTO: 109 K/MM3 (ref 150–450)
POTASSIUM SERPLBLD-SCNC: 4.5 MMOL/L (ref 3.5–5.1)
PROT SERPL-MCNC: 7.4 G/DL (ref 6.3–8.2)
PROT UR STRIP-MCNC: NEGATIVE MG/DL
RBC # BLD AUTO: 4.94 M/MM3 (ref 4.1–5.6)
RBC #/AREA URNS HPF: (no result) /HPF (ref 0–2)
SODIUM SERPL-SCNC: 130 MMOL/L (ref 137–145)
WBC # BLD AUTO: 3.2 K/MM3 (ref 4–10.5)
WBC #/AREA URNS HPF: (no result) /HPF (ref 0–5)

## 2022-01-27 PROCEDURE — 82947 ASSAY GLUCOSE BLOOD QUANT: CPT

## 2022-01-27 PROCEDURE — 99284 EMERGENCY DEPT VISIT MOD MDM: CPT

## 2022-01-27 PROCEDURE — 93005 ELECTROCARDIOGRAM TRACING: CPT

## 2022-01-27 PROCEDURE — 36415 COLL VENOUS BLD VENIPUNCTURE: CPT

## 2022-01-27 PROCEDURE — 36000 PLACE NEEDLE IN VEIN: CPT

## 2022-01-27 PROCEDURE — 71045 X-RAY EXAM CHEST 1 VIEW: CPT

## 2022-01-27 PROCEDURE — 83880 ASSAY OF NATRIURETIC PEPTIDE: CPT

## 2022-01-27 PROCEDURE — 84484 ASSAY OF TROPONIN QUANT: CPT

## 2022-01-27 PROCEDURE — 70450 CT HEAD/BRAIN W/O DYE: CPT

## 2022-01-27 PROCEDURE — 85025 COMPLETE CBC W/AUTO DIFF WBC: CPT

## 2022-01-27 PROCEDURE — 80053 COMPREHEN METABOLIC PANEL: CPT

## 2022-01-27 PROCEDURE — 96360 HYDRATION IV INFUSION INIT: CPT

## 2022-01-27 PROCEDURE — 81001 URINALYSIS AUTO W/SCOPE: CPT

## 2022-01-27 PROCEDURE — 99000 SPECIMEN HANDLING OFFICE-LAB: CPT

## 2022-01-27 NOTE — ERPHSYRPT
- History of Present Illness


Time Seen by Provider: 01/27/22 14:19


Source: patient


Exam Limitations: no limitations


Patient Subjective Stated Complaint: pt reports syncopal episode this morning, 

states he believes his sodium may be low, states history of low sodium and syn

cope. reports on 1/22/22 he developed a fever and after that some diarrhea and 

trouble sleeping which has lead to his episode today. reports he feels 

lightheaded and his legs feel weak. reports he has long standing history with 

constipation as well, r


Triage Nursing Assessment: pt is aox3, afebrile, pupils perrl, resps easy and 

non labored, cap refill  < 3 seconds, radial pulses strong and equal, pt skin 

pink warm dry. no obvious injury or deformity noted.


Physician History: 





61-year-old male presented in the ER with chief complaint of near syncopal 

episode.  Patient reports he has not been feeling well for almost 1 week which 

initially he had  fever chills followed by diarrhea for couple of days.  Patient

feels weak fatigued tired and was standing, started to feel lightheaded and 

collapsed on the floor.  Does not remember hitting his head.  No loss of 

consciousness per patient but was close to it.  He was able to get up on its own

after few seconds and denies any headache, numbness tingling or focal weakness. 

Patient reports having similar symptoms in the past with hyponatremia.  Denies 

any chest pain palpitations or shortness of breath.


Prior Episodes: single episode today


Timing/Duration: today, sudden, improved


Precipitating Factors: lightheadedness


Context: standing


Loss of Consciousness: no loss of consciousness


Charcter of event(s): collapsed


Allergies/Adverse Reactions: 








Sulfa (Sulfonamide Antibiotics) Allergy (Intermediate, Verified 01/27/22 14:39)


   burn when urinating





Home Medications: 








Clopidogrel Bisulfate [Clopidogrel] 75 mg PO DAILY 04/03/18 [History]





Hx Tetanus, Diphtheria Vaccination/Date Given: Yes


Hx Influenza Vaccination/Date Given: No


Hx Pneumococcal Vaccination/Date Given: No


Immunizations Up to Date: Yes





Travel Risk





- International Travel


Have you traveled outside of the country in past 3 weeks: No





- Coronavirus Screening


Symptoms: Fever, Vomiting/Diarrhea


Close contact with a COVID-19 positive Pt in past 14-21 Days: No





- Vaccine Status


Have you recieved a Covid-19 vaccination: No





- Past Medical History


Pertinent Past Medical History: Yes


Neurological History: Stroke


ENT History: No Pertinent History


Cardiac History: No Pertinent History


Respiratory History: No Pertinent History


Endocrine Medical History: No Pertinent History


Musculoskeletal History: Arthritis


GI Medical History: Other


 History: Renal Disease


Psycho-Social History: Anxiety


Male Reproductive Disorders: No Pertinent History


Other Medical History: constipation, FAILED STRESS TEST 7/2020.  patient is a 

poor historian





- Past Surgical History


Past Surgical History: No





- Social History


Smoking Status: Never smoker


Exposure to second hand smoke: No


Drug Use: none


Patient Lives Alone: Yes





- Review of Systems


Constitutional: Fatigue, Weakness


Eyes: No Symptoms


Ears, Nose, & Throat: No Symptoms


Respiratory: No Symptoms


Cardiac: No Symptoms


Abdominal/Gastrointestinal: Diarrhea


Genitourinary Symptoms: No Symptoms


Musculoskeletal: No Symptoms


Skin: No Symptoms


Neurological: Dizziness


Psychological: No Symptoms


Endocrine: No Symptoms


Hematologic/Lymphatic: No Symptoms


Immunological/Allergic: No Symptoms





Physical Exam





- Nursing Vital Signs


Nursing Vital Signs: 


                               Initial Vital Signs











Temperature  97.7 F   01/27/22 14:25


 


Pulse Rate  80   01/27/22 14:25


 


Respiratory Rate  21   01/27/22 14:25


 


Blood Pressure  178/104   01/27/22 14:25


 


O2 Sat by Pulse Oximetry  100   01/27/22 14:25








                                   Pain Scale











Pain Intensity                 0

















- McCoy Coma Scale


Best Eye Response (McCoy): (4) open spontaneously


Best Verbal Response (Dulce): (5) oriented


Best Motor Response (Dulce): (6) obeys commands


Dulce Total: 15





- Physical Exam


General Appearance: no apparent distress, alert, anxiety


Eye Exam: bilateral eye: normal inspection, PERRL, EOMI


Ears, Nose, Throat Exam: normal ENT inspection, TMs normal, pharynx normal, 

moist mucous membranes


Neck Exam: normal inspection, non-tender, supple, full range of motion


Respiratory: normal breath sounds, lungs clear


Cardiovascular: regular rate/rhythm, normal heart sounds


Gastrointestinal: soft, normal bowel sounds


Back Exam: normal inspection, normal range of motion


Extremity Exam: normal inspection, normal range of motion


Mental Status: alert, oriented x 3, cooperative


CNs Exam: normal hearing, normal speech, PERRL


Coordination/Gait: normal finger to nose, normal gait, normal cerebellar 

function


Motor/Sensory: no motor deficit, no sensory deficit, no pronator drift, negative

 Babinski's sign


DTR: bicep (R): 2+, bicep (L): 2+, knee (R): 2+, knee (L): 2+


Skin Exam: normal color


**SpO2 Interpretation**: normal


SpO2: 100


O2 Delivery: Room Air





- Course


EKG Interpreted by Me: RATE (72), Sinus Rhythm, NORMAL AXIS, NORMAL INTERVALS, 

NORMAL QRS


Ordered Tests: 


                               Active Orders 24 hr











 Category Date Time Status


 


 EKG-ER Only STAT Care  01/27/22 14:52 Active


 


 IV Insertion STAT Care  01/27/22 14:52 Active


 


 Orthostatic Vital Signs STAT Care  01/27/22 14:52 Active


 


 POCT Glucose Check STAT Care  01/27/22 14:52 Active


 


 CHEST 1 VIEW (PORTABLE) Stat Exams  01/27/22 14:52 Completed


 


 HEAD WITHOUT CONTRAST [CT] Stat Exams  01/27/22 14:53 Completed


 


 CBC W DIFF Stat Lab  01/27/22 15:20 Completed


 


 CMP Stat Lab  01/27/22 15:20 Completed


 


 COVID AG-BINAX NOW RAPID TEST Stat Lab  01/27/22 17:15 Completed


 


 NT PRO BNP Stat Lab  01/27/22 15:20 Completed


 


 POCT GLUCOSE Stat Lab  01/27/22 15:31 Completed


 


 TROPONIN Q3H Lab  01/27/22 15:00 Completed


 


 TROPONIN Q3H Lab  01/27/22 18:00 Ordered


 


 TROPONIN Q3H Lab  01/27/22 21:00 Ordered


 


 TROPONIN Q3H Lab  01/28/22 00:00 Ordered


 


 TROPONIN Q3H Lab  01/28/22 03:00 Ordered


 


 UA W/RFX UR CULTURE Stat Lab  01/27/22 15:33 Completed








Medication Summary














Discontinued Medications














Generic Name Dose Route Start Last Admin





  Trade Name Christina  PRN Reason Stop Dose Admin


 


Sodium Chloride  1,000 mls @ 999 mls/hr  01/27/22 14:52  01/27/22 16:48





  Sodium Chloride 0.9% 1000 Ml  IV  01/27/22 15:52  Infused





  .Q1H1M STA   Infusion


 


Sodium Chloride  Confirm  01/27/22 15:22 





  Sodium Chloride 0.9% 1000 Ml  Administered  01/27/22 15:23 





  Dose  





  1,000 mls @ ud  





  .ROUTE  





  .STK-MED ONE  











Lab/Rad Data: 


                           Laboratory Result Diagrams





                                 01/27/22 15:20 





                                 01/27/22 15:20 





                               Laboratory Results











  01/27/22 01/27/22 01/27/22 Range/Units





  17:15 15:33 15:31 


 


WBC     (4.0-10.5)  K/mm3


 


RBC     (4.1-5.6)  M/mm3


 


Hgb     (12.5-18.0)  gm/dl


 


Hct     (42-50)  %


 


MCV     ()  fl


 


MCH     (26-32)  pg


 


MCHC     (32-36)  g/dl


 


RDW     (11.5-14.0)  %


 


Plt Count     (150-450)  K/mm3


 


MPV     (7.5-11.0)  fl


 


Gran %     (36.0-66.0)  %


 


Eos # (Auto)     (0-0.5)  


 


Absolute Lymphs (auto)     (1.0-4.6)  


 


Absolute Monos (auto)     (0.0-1.3)  


 


Lymphocytes %     (24.0-44.0)  %


 


Monocytes %     (0.0-12.0)  %


 


Eosinophils %     (0.00-5.0)  %


 


Basophils %     (0.0-0.4)  %


 


Absolute Granulocytes     (1.4-6.9)  


 


Basophils #     (0-0.4)  


 


Sodium     (137-145)  mmol/L


 


Potassium     (3.5-5.1)  mmol/L


 


Chloride     ()  mmol/L


 


Carbon Dioxide     (22-30)  mmol/L


 


Anion Gap     (5-15)  MEQ/L


 


BUN     (9-20)  mg/dL


 


Creatinine     (0.66-1.25)  mg/dL


 


Estimated GFR     ML/MIN


 


Glucose     ()  mg/dL


 


POC Glucometer    105  (74 to 106)  mg/dL


 


Calcium     (8.4-10.2)  mg/dL


 


Total Bilirubin     (0.2-1.3)  mg/dL


 


AST     (17-59)  U/L


 


ALT     (0-50)  U/L


 


Alkaline Phosphatase     ()  U/L


 


Troponin I     (0.000-0.034)  ng/mL


 


NT-Pro-B Natriuret Pep     (0-900)  pg/mL


 


Serum Total Protein     (6.3-8.2)  g/dL


 


Albumin     (3.5-5.0)  g/dL


 


Urine Color   STRAW   (YELLOW)  


 


Urine Appearance   CLEAR   (CLEAR)  


 


Urine pH   7.0   (5-6)  


 


Ur Specific Gravity   1.006   (1.005-1.025)  


 


Urine Protein   NEGATIVE   (Negative)  


 


Urine Ketones   NEGATIVE   (NEGATIVE)  


 


Urine Blood   NEGATIVE   (0-5)  Kg/ul


 


Urine Nitrite   NEGATIVE   (NEGATIVE)  


 


Urine Bilirubin   NEGATIVE   (NEGATIVE)  


 


Urine Urobilinogen   NEGATIVE   (0-1)  mg/dL


 


Ur Leukocyte Esterase   NEGATIVE   (NEGATIVE)  


 


Urine WBC (Auto)   NONE   (0-5)  /HPF


 


Urine RBC (Auto)   NONE   (0-2)  /HPF


 


U Epithel Cells (Auto)   NONE   (FEW)  /HPF


 


Urine Bacteria (Auto)   NONE   (NEGATIVE)  /HPF


 


Urine Mucus (Auto)   SLIGHT   (NEGATIVE)  /HPF


 


Urine Culture Reflexed   NO   (NO)  


 


Urine Glucose   NEGATIVE   (NEGATIVE)  mg/dL


 


SARS-CoV-2 Ag (Rapid)  POSITIVE A*    (NEGATIVE)  


 


Slides for Path Review     














  01/27/22 01/27/22 01/27/22 Range/Units





  15:20 15:20 15:20 


 


WBC    3.2 L  (4.0-10.5)  K/mm3


 


RBC    4.94  (4.1-5.6)  M/mm3


 


Hgb    15.0  (12.5-18.0)  gm/dl


 


Hct    43.5  (42-50)  %


 


MCV    88.1  ()  fl


 


MCH    30.4  (26-32)  pg


 


MCHC    34.5  (32-36)  g/dl


 


RDW    11.8  (11.5-14.0)  %


 


Plt Count    109 L  (150-450)  K/mm3


 


MPV    10.9  (7.5-11.0)  fl


 


Gran %    76.2 H  (36.0-66.0)  %


 


Eos # (Auto)    0  (0-0.5)  


 


Absolute Lymphs (auto)    0.43 L  (1.0-4.6)  


 


Absolute Monos (auto)    0.33  (0.0-1.3)  


 


Lymphocytes %    13.5 L  (24.0-44.0)  %


 


Monocytes %    10.3  (0.0-12.0)  %


 


Eosinophils %    0.0  (0.00-5.0)  %


 


Basophils %    0.0  (0.0-0.4)  %


 


Absolute Granulocytes    2.43  (1.4-6.9)  


 


Basophils #    0  (0-0.4)  


 


Sodium   130 L   (137-145)  mmol/L


 


Potassium   4.5   (3.5-5.1)  mmol/L


 


Chloride   93 L   ()  mmol/L


 


Carbon Dioxide   27   (22-30)  mmol/L


 


Anion Gap   14.7   (5-15)  MEQ/L


 


BUN   4 L   (9-20)  mg/dL


 


Creatinine   0.67   (0.66-1.25)  mg/dL


 


Estimated GFR   > 60.0   ML/MIN


 


Glucose   108 H   ()  mg/dL


 


POC Glucometer     (74 to 106)  mg/dL


 


Calcium   8.9   (8.4-10.2)  mg/dL


 


Total Bilirubin   0.50   (0.2-1.3)  mg/dL


 


AST   34   (17-59)  U/L


 


ALT   30   (0-50)  U/L


 


Alkaline Phosphatase   76   ()  U/L


 


Troponin I     (0.000-0.034)  ng/mL


 


NT-Pro-B Natriuret Pep  24.2    (0-900)  pg/mL


 


Serum Total Protein   7.4   (6.3-8.2)  g/dL


 


Albumin   4.7   (3.5-5.0)  g/dL


 


Urine Color     (YELLOW)  


 


Urine Appearance     (CLEAR)  


 


Urine pH     (5-6)  


 


Ur Specific Gravity     (1.005-1.025)  


 


Urine Protein     (Negative)  


 


Urine Ketones     (NEGATIVE)  


 


Urine Blood     (0-5)  Kg/ul


 


Urine Nitrite     (NEGATIVE)  


 


Urine Bilirubin     (NEGATIVE)  


 


Urine Urobilinogen     (0-1)  mg/dL


 


Ur Leukocyte Esterase     (NEGATIVE)  


 


Urine WBC (Auto)     (0-5)  /HPF


 


Urine RBC (Auto)     (0-2)  /HPF


 


U Epithel Cells (Auto)     (FEW)  /HPF


 


Urine Bacteria (Auto)     (NEGATIVE)  /HPF


 


Urine Mucus (Auto)     (NEGATIVE)  /HPF


 


Urine Culture Reflexed     (NO)  


 


Urine Glucose     (NEGATIVE)  mg/dL


 


SARS-CoV-2 Ag (Rapid)     (NEGATIVE)  


 


Slides for Path Review    YES  














  01/27/22 Range/Units





  15:00 


 


WBC   (4.0-10.5)  K/mm3


 


RBC   (4.1-5.6)  M/mm3


 


Hgb   (12.5-18.0)  gm/dl


 


Hct   (42-50)  %


 


MCV   ()  fl


 


MCH   (26-32)  pg


 


MCHC   (32-36)  g/dl


 


RDW   (11.5-14.0)  %


 


Plt Count   (150-450)  K/mm3


 


MPV   (7.5-11.0)  fl


 


Gran %   (36.0-66.0)  %


 


Eos # (Auto)   (0-0.5)  


 


Absolute Lymphs (auto)   (1.0-4.6)  


 


Absolute Monos (auto)   (0.0-1.3)  


 


Lymphocytes %   (24.0-44.0)  %


 


Monocytes %   (0.0-12.0)  %


 


Eosinophils %   (0.00-5.0)  %


 


Basophils %   (0.0-0.4)  %


 


Absolute Granulocytes   (1.4-6.9)  


 


Basophils #   (0-0.4)  


 


Sodium   (137-145)  mmol/L


 


Potassium   (3.5-5.1)  mmol/L


 


Chloride   ()  mmol/L


 


Carbon Dioxide   (22-30)  mmol/L


 


Anion Gap   (5-15)  MEQ/L


 


BUN   (9-20)  mg/dL


 


Creatinine   (0.66-1.25)  mg/dL


 


Estimated GFR   ML/MIN


 


Glucose   ()  mg/dL


 


POC Glucometer   (74 to 106)  mg/dL


 


Calcium   (8.4-10.2)  mg/dL


 


Total Bilirubin   (0.2-1.3)  mg/dL


 


AST   (17-59)  U/L


 


ALT   (0-50)  U/L


 


Alkaline Phosphatase   ()  U/L


 


Troponin I  < 0.012  (0.000-0.034)  ng/mL


 


NT-Pro-B Natriuret Pep   (0-900)  pg/mL


 


Serum Total Protein   (6.3-8.2)  g/dL


 


Albumin   (3.5-5.0)  g/dL


 


Urine Color   (YELLOW)  


 


Urine Appearance   (CLEAR)  


 


Urine pH   (5-6)  


 


Ur Specific Gravity   (1.005-1.025)  


 


Urine Protein   (Negative)  


 


Urine Ketones   (NEGATIVE)  


 


Urine Blood   (0-5)  Kg/ul


 


Urine Nitrite   (NEGATIVE)  


 


Urine Bilirubin   (NEGATIVE)  


 


Urine Urobilinogen   (0-1)  mg/dL


 


Ur Leukocyte Esterase   (NEGATIVE)  


 


Urine WBC (Auto)   (0-5)  /HPF


 


Urine RBC (Auto)   (0-2)  /HPF


 


U Epithel Cells (Auto)   (FEW)  /HPF


 


Urine Bacteria (Auto)   (NEGATIVE)  /HPF


 


Urine Mucus (Auto)   (NEGATIVE)  /HPF


 


Urine Culture Reflexed   (NO)  


 


Urine Glucose   (NEGATIVE)  mg/dL


 


SARS-CoV-2 Ag (Rapid)   (NEGATIVE)  


 


Slides for Path Review   














- Progress


Progress: improved


Progress Note: 





01/27/22 17:40


61-year-old is evaluated for syncopal episode with symptoms of gastroenteritis 

lately along with fever few days ago.  Patient has negative orthostatics.  EKG 

showed normal sinus rhythm without any ST elevation and no acute CT head 

findings.  Chest x-ray negative for any acute findings.  CBC consistent with 

Covid.  Mildly low sodium of 130, given fluids.  No UTI.  Patient has positive 

COVID-19.  Recommended observation admission which she refused.  Patient is not 

confused or altered at all.  Patient states "I do not want to stay in the 

hospital at all and I am feeling well".  He is advised to follow-up with his 

primary care for reevaluation.


Counseled pt/family regarding: lab results, diagnosis, need for follow-up, rad 

results





- Departure


Departure Disposition: AMA


Clinical Impression: 


 Syncope and collapse, COVID-19, Viral syndrome





Condition: Stable


Critical Care Time: No


Referrals: 


OMAR CADE [Primary Care Provider] - Follow up/PCP as directed (1-2

days for reevaluation)


Instructions:  Syncope (Fainting) (DC)


Additional Instructions: 


Plenty of fluids for reevaluation.  Return to ER for worsening weakness  Keep 

yourself well-hydrated.  Follow-up with primary care or if having increased 

dizziness, lightheadedness, syncope, chest pain palpitations or shortness of 

breath etc.

## 2022-01-27 NOTE — XRAY
Indication: Syncope.



Comparison: August 24, 2020.



Portable chest again hyperinflated and clear with incidental left lung

calcified granulomas.  Heart not enlarged.  Bony thorax intact again with

osteopenia and degenerative changes.  No new/acute findings.

## 2022-01-27 NOTE — XRAY
Indication: Syncope.



Multiple contiguous axial images obtained through the head without contrast.



Comparison: July 7, 2021.



Again age-appropriate global atrophy and small remote infarct adjacent to the

left frontal horn.  No acute intracranial hemorrhage, abnormal extra-axial

fluid collection, or mass effect.  Fourth ventricle is midline without

hydrocephalus.  Bony calvarium intact.  Visualized paranasal sinuses and

mastoid air cells are clear.



Impression: Stable small old infarct adjacent left frontal horn.  No new/acute

intracranial abnormalities.

## 2022-03-16 ENCOUNTER — HOSPITAL ENCOUNTER (OUTPATIENT)
Dept: HOSPITAL 33 - SDC | Age: 62
Discharge: HOME | End: 2022-03-16
Attending: FAMILY MEDICINE
Payer: MEDICARE

## 2022-03-16 VITALS — DIASTOLIC BLOOD PRESSURE: 60 MMHG | HEART RATE: 64 BPM | SYSTOLIC BLOOD PRESSURE: 112 MMHG

## 2022-03-16 VITALS — OXYGEN SATURATION: 99 %

## 2022-03-16 DIAGNOSIS — R19.4: ICD-10-CM

## 2022-03-16 DIAGNOSIS — K57.30: Primary | ICD-10-CM

## 2022-03-16 NOTE — OP
SURGERY DATE/TIME:  03/16/2022  0735    



PREOPERATIVE DIAGNOSIS:      Change in bowel habits.



POSTOPERATIVE DIAGNOSIS:    Diverticulosis.



PROCEDURE:    Diagnostic colonoscopy. 



SURGEON: Osmar Waldron M.D.



ANESTHESIA:  MAC by Kenny Urias CRNA. 



ESTIMATED BLOOD LOSS:  None. 



SPECIMENS:  None.          



DESCRIPTION OF PROCEDURE:  After informed written consent was obtained, the patient was 
taken to the endoscopy suite. He was placed in left lateral decubitus position. Anesthesia 
was titrated to desired level of consciousness. Digital rectal exam showed normal 
sphincter tone and no internal lesions. The scope was inserted into the rectum and 
sequentially the entire colonic mucosa was traversed. The level of cecum was reached and 
verified with direct visualization of the ileocecal valve. Upon withdrawal careful mucosal 
inspection revealed no gross abnormalities other than scattered diverticula mostly 
centered in the sigmoid colon. There were no masses, bleeding or ulcerations encountered. 
Prior to withdrawal retroflexion showed no internal lesions. The scope was removed. The 
patient was transferred to the recovery room in good condition.

## 2022-06-01 ENCOUNTER — HOSPITAL ENCOUNTER (EMERGENCY)
Dept: HOSPITAL 33 - ED | Age: 62
Discharge: HOME | End: 2022-06-01
Payer: MEDICARE

## 2022-06-01 VITALS — DIASTOLIC BLOOD PRESSURE: 102 MMHG | OXYGEN SATURATION: 99 % | SYSTOLIC BLOOD PRESSURE: 159 MMHG | HEART RATE: 60 BPM

## 2022-06-01 DIAGNOSIS — R07.89: Primary | ICD-10-CM

## 2022-06-01 DIAGNOSIS — E78.5: ICD-10-CM

## 2022-06-01 DIAGNOSIS — I10: ICD-10-CM

## 2022-06-01 DIAGNOSIS — Z79.02: ICD-10-CM

## 2022-06-01 DIAGNOSIS — Z79.899: ICD-10-CM

## 2022-06-01 DIAGNOSIS — Z86.16: ICD-10-CM

## 2022-06-01 LAB
ALBUMIN SERPL-MCNC: 4.6 G/DL (ref 3.5–5)
ALP SERPL-CCNC: 64 U/L (ref 38–126)
ALT SERPL-CCNC: 20 U/L (ref 0–50)
ANION GAP SERPL CALC-SCNC: 13.9 MEQ/L (ref 5–15)
AST SERPL QL: 27 U/L (ref 17–59)
BASOPHILS # BLD AUTO: 0.03 X10^3/UL (ref 0–0.4)
BILIRUB BLD-MCNC: 0.8 MG/DL (ref 0.2–1.3)
BNP SERPL-MCNC: 39.1 PG/ML (ref 0–900)
BUN SERPL-MCNC: 5 MG/DL (ref 9–20)
CALCIUM SPEC-MCNC: 9.4 MG/DL (ref 8.4–10.2)
CHLORIDE SERPL-SCNC: 95 MMOL/L (ref 98–107)
CO2 SERPL-SCNC: 25 MMOL/L (ref 22–30)
CREAT SERPL-MCNC: 0.7 MG/DL (ref 0.66–1.25)
EOSINOPHIL # BLD AUTO: 0.01 X10^3/UL (ref 0–0.5)
GFR SERPLBLD BASED ON 1.73 SQ M-ARVRAT: > 60 ML/MIN
GLUCOSE SERPL-MCNC: 98 MG/DL (ref 74–106)
HCT VFR BLD AUTO: 41 % (ref 42–50)
HGB BLD-MCNC: 14.3 G/DL (ref 12.5–18)
LIPASE SERPL-CCNC: 164 U/L (ref 23–300)
LYMPHOCYTES # SPEC AUTO: 0.97 X10^3/UL (ref 1–4.6)
MAGNESIUM SERPL-MCNC: 1.8 MG/DL (ref 1.6–2.3)
MCH RBC QN AUTO: 30.8 PG (ref 26–32)
MCHC RBC AUTO-ENTMCNC: 34.9 G/DL (ref 32–36)
MONOCYTES # BLD AUTO: 0.49 X10^3/UL (ref 0–1.3)
PLATELET # BLD AUTO: 150 X10^3/UL (ref 150–450)
POTASSIUM SERPLBLD-SCNC: 4.4 MMOL/L (ref 3.5–5.1)
PROT SERPL-MCNC: 6.9 G/DL (ref 6.3–8.2)
RBC # BLD AUTO: 4.65 X10^6/UL (ref 4.1–5.6)
SODIUM SERPL-SCNC: 129 MMOL/L (ref 137–145)
WBC # BLD AUTO: 6.1 X10^3/UL (ref 4–10.5)

## 2022-06-01 PROCEDURE — 84484 ASSAY OF TROPONIN QUANT: CPT

## 2022-06-01 PROCEDURE — 80053 COMPREHEN METABOLIC PANEL: CPT

## 2022-06-01 PROCEDURE — 36000 PLACE NEEDLE IN VEIN: CPT

## 2022-06-01 PROCEDURE — 85379 FIBRIN DEGRADATION QUANT: CPT

## 2022-06-01 PROCEDURE — 85025 COMPLETE CBC W/AUTO DIFF WBC: CPT

## 2022-06-01 PROCEDURE — 83880 ASSAY OF NATRIURETIC PEPTIDE: CPT

## 2022-06-01 PROCEDURE — 83690 ASSAY OF LIPASE: CPT

## 2022-06-01 PROCEDURE — 83735 ASSAY OF MAGNESIUM: CPT

## 2022-06-01 PROCEDURE — 93041 RHYTHM ECG TRACING: CPT

## 2022-06-01 PROCEDURE — 71045 X-RAY EXAM CHEST 1 VIEW: CPT

## 2022-06-01 PROCEDURE — 83605 ASSAY OF LACTIC ACID: CPT

## 2022-06-01 PROCEDURE — 36415 COLL VENOUS BLD VENIPUNCTURE: CPT

## 2022-06-01 PROCEDURE — 99284 EMERGENCY DEPT VISIT MOD MDM: CPT

## 2022-06-01 PROCEDURE — 93005 ELECTROCARDIOGRAM TRACING: CPT
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
## 2022-06-02 NOTE — XRAY
Indication: Chest tightness.



Comparison: January 27, 2022.



Portable chest remains clear again with incidental left lung calcified

granulomas.  Heart not enlarged.  Bony thorax intact again with mild

osteopenia and degenerative changes.  No new/acute findings.

## 2025-02-10 NOTE — XRAY
Indication: Constipation.



Comparison: None



2 views of the abdomen demonstrates mild/moderate diffuse scattered colonic

fecal debris.  No focal bowel dilatation, obstruction, or free air.  Solid

organs unremarkable.  Osseous structures intact with mild levoscoliosis.



Single frontal chest demonstrates normal heart, lungs, and bony thorax with a

few incidental calcified granulomas.



Impression:

1.  Fecal stasis without obstruction.

2.  Nonacute 1V chest.
61M with PMHX Asthma, HTN, HLD, Gout, Pre-DM presented to [facility] ER c/o SOB/MCCLURE. Found to be hypoxic satting 92% on 2L O2 NC at rest. Placed in observation but remained wheezing and de-satted with effort/ambulation requiring 4L NC. RVP +Influenza. CXR hyperinflated but otherwise negative. Labs reviewed. Treated with steroids/duonebs/tamiflu. Agreeable to plan for admission. Denies F/C. No N/V. No other complaints.    ROS Negative unless mentioned.